# Patient Record
Sex: FEMALE | Race: WHITE | NOT HISPANIC OR LATINO | Employment: OTHER | URBAN - METROPOLITAN AREA
[De-identification: names, ages, dates, MRNs, and addresses within clinical notes are randomized per-mention and may not be internally consistent; named-entity substitution may affect disease eponyms.]

---

## 2019-09-08 ENCOUNTER — HOSPITAL ENCOUNTER (OUTPATIENT)
Dept: RADIOLOGY | Facility: MEDICAL CENTER | Age: 84
End: 2019-09-08

## 2019-09-08 ENCOUNTER — HOSPITAL ENCOUNTER (INPATIENT)
Facility: MEDICAL CENTER | Age: 84
LOS: 5 days | DRG: 291 | End: 2019-09-13
Admitting: FAMILY MEDICINE
Payer: MEDICARE

## 2019-09-08 ENCOUNTER — HOSPITAL ENCOUNTER (OUTPATIENT)
Facility: MEDICAL CENTER | Age: 84
DRG: 291 | End: 2019-09-08
Payer: MEDICARE

## 2019-09-08 DIAGNOSIS — I50.9 ACUTE ON CHRONIC CONGESTIVE HEART FAILURE, UNSPECIFIED HEART FAILURE TYPE (HCC): ICD-10-CM

## 2019-09-08 DIAGNOSIS — I50.33 ACUTE ON CHRONIC DIASTOLIC CONGESTIVE HEART FAILURE (HCC): ICD-10-CM

## 2019-09-08 DIAGNOSIS — J96.21 ACUTE ON CHRONIC RESPIRATORY FAILURE WITH HYPOXIA (HCC): ICD-10-CM
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
## 2019-09-08 PROBLEM — B02.9 HERPES ZOSTER WITHOUT COMPLICATION: Status: ACTIVE | Noted: 2019-09-08

## 2019-09-08 PROBLEM — J96.11 CHRONIC RESPIRATORY FAILURE WITH HYPOXIA (HCC): Status: ACTIVE | Noted: 2019-09-08

## 2019-09-08 PROBLEM — D50.9 MICROCYTIC ANEMIA: Status: ACTIVE | Noted: 2019-09-08

## 2019-09-08 PROBLEM — R79.89 ELEVATED TROPONIN: Status: ACTIVE | Noted: 2019-09-08

## 2019-09-08 PROBLEM — I48.0 PAROXYSMAL ATRIAL FIBRILLATION (HCC): Status: ACTIVE | Noted: 2019-09-08

## 2019-09-08 LAB
FERRITIN SERPL-MCNC: 32.3 NG/ML (ref 10–291)
IRON SATN MFR SERPL: 5 % (ref 15–55)
IRON SERPL-MCNC: 21 UG/DL (ref 40–170)
TIBC SERPL-MCNC: 412 UG/DL (ref 250–450)
TROPONIN T SERPL-MCNC: 23 NG/L (ref 6–19)

## 2019-09-08 PROCEDURE — 83550 IRON BINDING TEST: CPT

## 2019-09-08 PROCEDURE — 84484 ASSAY OF TROPONIN QUANT: CPT

## 2019-09-08 PROCEDURE — 83540 ASSAY OF IRON: CPT

## 2019-09-08 PROCEDURE — 99223 1ST HOSP IP/OBS HIGH 75: CPT | Performed by: HOSPITALIST

## 2019-09-08 PROCEDURE — 36415 COLL VENOUS BLD VENIPUNCTURE: CPT

## 2019-09-08 PROCEDURE — 700102 HCHG RX REV CODE 250 W/ 637 OVERRIDE(OP): Performed by: HOSPITALIST

## 2019-09-08 PROCEDURE — A9270 NON-COVERED ITEM OR SERVICE: HCPCS | Performed by: HOSPITALIST

## 2019-09-08 PROCEDURE — 82728 ASSAY OF FERRITIN: CPT

## 2019-09-08 PROCEDURE — 770027 HCHG ROOM/CARE - NEGATIVE PRESSURE ISOLATION

## 2019-09-08 RX ORDER — LOSARTAN POTASSIUM 50 MG/1
50 TABLET ORAL 2 TIMES DAILY
COMMUNITY
Start: 2019-08-19

## 2019-09-08 RX ORDER — APIXABAN 5 MG/1
TABLET, FILM COATED ORAL
Status: ON HOLD | COMMUNITY
Start: 2019-08-19 | End: 2019-09-08

## 2019-09-08 RX ORDER — BISACODYL 10 MG
10 SUPPOSITORY, RECTAL RECTAL
Status: DISCONTINUED | OUTPATIENT
Start: 2019-09-08 | End: 2019-09-13 | Stop reason: HOSPADM

## 2019-09-08 RX ORDER — ONDANSETRON 4 MG/1
4 TABLET, ORALLY DISINTEGRATING ORAL EVERY 4 HOURS PRN
Status: DISCONTINUED | OUTPATIENT
Start: 2019-09-08 | End: 2019-09-13 | Stop reason: HOSPADM

## 2019-09-08 RX ORDER — AMIODARONE HYDROCHLORIDE 200 MG/1
200 TABLET ORAL EVERY MORNING
COMMUNITY
Start: 2019-08-19

## 2019-09-08 RX ORDER — POTASSIUM CHLORIDE 20 MEQ/1
10 TABLET, EXTENDED RELEASE ORAL DAILY
Status: DISCONTINUED | OUTPATIENT
Start: 2019-09-09 | End: 2019-09-13 | Stop reason: HOSPADM

## 2019-09-08 RX ORDER — LOSARTAN POTASSIUM 50 MG/1
50 TABLET ORAL 2 TIMES DAILY
Status: DISCONTINUED | OUTPATIENT
Start: 2019-09-08 | End: 2019-09-13 | Stop reason: HOSPADM

## 2019-09-08 RX ORDER — LOSARTAN POTASSIUM 50 MG/1
50 TABLET ORAL
Status: DISCONTINUED | OUTPATIENT
Start: 2019-09-09 | End: 2019-09-08

## 2019-09-08 RX ORDER — POLYETHYLENE GLYCOL 3350 17 G/17G
1 POWDER, FOR SOLUTION ORAL
Status: DISCONTINUED | OUTPATIENT
Start: 2019-09-08 | End: 2019-09-13 | Stop reason: HOSPADM

## 2019-09-08 RX ORDER — AMOXICILLIN 250 MG
2 CAPSULE ORAL 2 TIMES DAILY
Status: DISCONTINUED | OUTPATIENT
Start: 2019-09-08 | End: 2019-09-13 | Stop reason: HOSPADM

## 2019-09-08 RX ORDER — ROSUVASTATIN CALCIUM 20 MG/1
10 TABLET, COATED ORAL EVERY EVENING
Status: DISCONTINUED | OUTPATIENT
Start: 2019-09-08 | End: 2019-09-13 | Stop reason: HOSPADM

## 2019-09-08 RX ORDER — AMIODARONE HYDROCHLORIDE 200 MG/1
200 TABLET ORAL
Status: DISCONTINUED | OUTPATIENT
Start: 2019-09-09 | End: 2019-09-13 | Stop reason: HOSPADM

## 2019-09-08 RX ORDER — FUROSEMIDE 40 MG/1
40 TABLET ORAL 2 TIMES DAILY
COMMUNITY
Start: 2019-08-19

## 2019-09-08 RX ORDER — METOPROLOL SUCCINATE 50 MG/1
100 TABLET, EXTENDED RELEASE ORAL
Status: DISCONTINUED | OUTPATIENT
Start: 2019-09-09 | End: 2019-09-13 | Stop reason: HOSPADM

## 2019-09-08 RX ORDER — POTASSIUM CHLORIDE 750 MG/1
10 TABLET, EXTENDED RELEASE ORAL 2 TIMES DAILY
COMMUNITY
Start: 2019-09-05

## 2019-09-08 RX ORDER — ROSUVASTATIN CALCIUM 10 MG/1
10 TABLET, COATED ORAL
COMMUNITY

## 2019-09-08 RX ORDER — ACETAMINOPHEN 325 MG/1
650 TABLET ORAL EVERY 6 HOURS PRN
Status: DISCONTINUED | OUTPATIENT
Start: 2019-09-08 | End: 2019-09-13 | Stop reason: HOSPADM

## 2019-09-08 RX ORDER — ONDANSETRON 2 MG/ML
4 INJECTION INTRAMUSCULAR; INTRAVENOUS EVERY 4 HOURS PRN
Status: DISCONTINUED | OUTPATIENT
Start: 2019-09-08 | End: 2019-09-13 | Stop reason: HOSPADM

## 2019-09-08 RX ORDER — METOPROLOL SUCCINATE 100 MG/1
100 TABLET, EXTENDED RELEASE ORAL EVERY MORNING
COMMUNITY
Start: 2019-08-19

## 2019-09-08 RX ORDER — VALACYCLOVIR HYDROCHLORIDE 500 MG/1
1000 TABLET, FILM COATED ORAL 3 TIMES DAILY
Status: DISCONTINUED | OUTPATIENT
Start: 2019-09-08 | End: 2019-09-13 | Stop reason: HOSPADM

## 2019-09-08 RX ADMIN — APIXABAN 5 MG: 5 TABLET, FILM COATED ORAL at 22:12

## 2019-09-08 RX ADMIN — SENNOSIDES, DOCUSATE SODIUM 2 TABLET: 50; 8.6 TABLET, FILM COATED ORAL at 22:13

## 2019-09-08 RX ADMIN — LOSARTAN POTASSIUM 50 MG: 50 TABLET ORAL at 22:18

## 2019-09-08 RX ADMIN — VALACYCLOVIR HYDROCHLORIDE 1000 MG: 500 TABLET, FILM COATED ORAL at 22:13

## 2019-09-08 ASSESSMENT — PATIENT HEALTH QUESTIONNAIRE - PHQ9
1. LITTLE INTEREST OR PLEASURE IN DOING THINGS: NOT AT ALL
2. FEELING DOWN, DEPRESSED, IRRITABLE, OR HOPELESS: NOT AT ALL
SUM OF ALL RESPONSES TO PHQ9 QUESTIONS 1 AND 2: 0

## 2019-09-08 ASSESSMENT — LIFESTYLE VARIABLES
TOTAL SCORE: 0
TOTAL SCORE: 0
ALCOHOL_USE: NO
DOES PATIENT WANT TO STOP DRINKING: CANNOT ASSESS
EVER HAD A DRINK FIRST THING IN THE MORNING TO STEADY YOUR NERVES TO GET RID OF A HANGOVER: NO
AVERAGE NUMBER OF DAYS PER WEEK YOU HAVE A DRINK CONTAINING ALCOHOL: 0
HAVE PEOPLE ANNOYED YOU BY CRITICIZING YOUR DRINKING: NO
TOTAL SCORE: 0
EVER FELT BAD OR GUILTY ABOUT YOUR DRINKING: NO
EVER_SMOKED: NEVER
HAVE YOU EVER FELT YOU SHOULD CUT DOWN ON YOUR DRINKING: NO
HOW MANY TIMES IN THE PAST YEAR HAVE YOU HAD 5 OR MORE DRINKS IN A DAY: 0
ON A TYPICAL DAY WHEN YOU DRINK ALCOHOL HOW MANY DRINKS DO YOU HAVE: 0
CONSUMPTION TOTAL: NEGATIVE

## 2019-09-08 ASSESSMENT — ENCOUNTER SYMPTOMS
SHORTNESS OF BREATH: 1
COUGH: 1

## 2019-09-08 NOTE — PROGRESS NOTES
Please release ADT order that is signed & held.  Please page the Direct Admit On Call Hospitalist & Cardiologist to notify of patient arrival.  Thank you.

## 2019-09-08 NOTE — PROGRESS NOTES
Transfer from [facility] secondary to CHF exacerbation, slightly elevated troponin of 0.10 and 0.11, cardiology Bailey was called on the case, chronic respiratory failure

## 2019-09-09 PROBLEM — J96.21 ACUTE ON CHRONIC RESPIRATORY FAILURE WITH HYPOXIA (HCC): Status: ACTIVE | Noted: 2019-09-08

## 2019-09-09 PROBLEM — N17.9 AKI (ACUTE KIDNEY INJURY) (HCC): Status: ACTIVE | Noted: 2019-09-09

## 2019-09-09 LAB
ALBUMIN SERPL BCP-MCNC: 3.2 G/DL (ref 3.2–4.9)
ALBUMIN/GLOB SERPL: 1.3 G/DL
ALP SERPL-CCNC: 62 U/L (ref 30–99)
ALT SERPL-CCNC: 26 U/L (ref 2–50)
ANION GAP SERPL CALC-SCNC: 6 MMOL/L (ref 0–11.9)
ANION GAP SERPL CALC-SCNC: 8 MMOL/L (ref 0–11.9)
ANISOCYTOSIS BLD QL SMEAR: ABNORMAL
AST SERPL-CCNC: 31 U/L (ref 12–45)
BASOPHILS # BLD AUTO: 0.6 % (ref 0–1.8)
BASOPHILS # BLD AUTO: 0.6 % (ref 0–1.8)
BASOPHILS # BLD: 0.03 K/UL (ref 0–0.12)
BASOPHILS # BLD: 0.03 K/UL (ref 0–0.12)
BILIRUB SERPL-MCNC: 0.8 MG/DL (ref 0.1–1.5)
BUN SERPL-MCNC: 19 MG/DL (ref 8–22)
BUN SERPL-MCNC: 21 MG/DL (ref 8–22)
CALCIUM SERPL-MCNC: 8.4 MG/DL (ref 8.5–10.5)
CALCIUM SERPL-MCNC: 8.5 MG/DL (ref 8.5–10.5)
CHLORIDE SERPL-SCNC: 107 MMOL/L (ref 96–112)
CHLORIDE SERPL-SCNC: 109 MMOL/L (ref 96–112)
CO2 SERPL-SCNC: 29 MMOL/L (ref 20–33)
CO2 SERPL-SCNC: 31 MMOL/L (ref 20–33)
COMMENT 1642: NORMAL
CREAT SERPL-MCNC: 0.96 MG/DL (ref 0.5–1.4)
CREAT SERPL-MCNC: 0.99 MG/DL (ref 0.5–1.4)
EOSINOPHIL # BLD AUTO: 0.09 K/UL (ref 0–0.51)
EOSINOPHIL # BLD AUTO: 0.14 K/UL (ref 0–0.51)
EOSINOPHIL NFR BLD: 1.8 % (ref 0–6.9)
EOSINOPHIL NFR BLD: 2.9 % (ref 0–6.9)
ERYTHROCYTE [DISTWIDTH] IN BLOOD BY AUTOMATED COUNT: 56.1 FL (ref 35.9–50)
ERYTHROCYTE [DISTWIDTH] IN BLOOD BY AUTOMATED COUNT: 56.7 FL (ref 35.9–50)
GLOBULIN SER CALC-MCNC: 2.4 G/DL (ref 1.9–3.5)
GLUCOSE SERPL-MCNC: 116 MG/DL (ref 65–99)
GLUCOSE SERPL-MCNC: 117 MG/DL (ref 65–99)
HCT VFR BLD AUTO: 31.1 % (ref 37–47)
HCT VFR BLD AUTO: 31.8 % (ref 37–47)
HGB BLD-MCNC: 8.6 G/DL (ref 12–16)
HGB BLD-MCNC: 8.7 G/DL (ref 12–16)
HYPOCHROMIA BLD QL SMEAR: ABNORMAL
IMM GRANULOCYTES # BLD AUTO: 0.02 K/UL (ref 0–0.11)
IMM GRANULOCYTES # BLD AUTO: 0.03 K/UL (ref 0–0.11)
IMM GRANULOCYTES NFR BLD AUTO: 0.4 % (ref 0–0.9)
IMM GRANULOCYTES NFR BLD AUTO: 0.6 % (ref 0–0.9)
LYMPHOCYTES # BLD AUTO: 0.62 K/UL (ref 1–4.8)
LYMPHOCYTES # BLD AUTO: 0.81 K/UL (ref 1–4.8)
LYMPHOCYTES NFR BLD: 12.3 % (ref 22–41)
LYMPHOCYTES NFR BLD: 16.9 % (ref 22–41)
MCH RBC QN AUTO: 22.8 PG (ref 27–33)
MCH RBC QN AUTO: 22.9 PG (ref 27–33)
MCHC RBC AUTO-ENTMCNC: 27.4 G/DL (ref 33.6–35)
MCHC RBC AUTO-ENTMCNC: 27.7 G/DL (ref 33.6–35)
MCV RBC AUTO: 82.9 FL (ref 81.4–97.8)
MCV RBC AUTO: 83.2 FL (ref 81.4–97.8)
MICROCYTES BLD QL SMEAR: ABNORMAL
MONOCYTES # BLD AUTO: 0.37 K/UL (ref 0–0.85)
MONOCYTES # BLD AUTO: 0.38 K/UL (ref 0–0.85)
MONOCYTES NFR BLD AUTO: 7.5 % (ref 0–13.4)
MONOCYTES NFR BLD AUTO: 7.7 % (ref 0–13.4)
MORPHOLOGY BLD-IMP: NORMAL
NEUTROPHILS # BLD AUTO: 3.41 K/UL (ref 2–7.15)
NEUTROPHILS # BLD AUTO: 3.89 K/UL (ref 2–7.15)
NEUTROPHILS NFR BLD: 71.5 % (ref 44–72)
NEUTROPHILS NFR BLD: 77.2 % (ref 44–72)
NRBC # BLD AUTO: 0 K/UL
NRBC # BLD AUTO: 0 K/UL
NRBC BLD-RTO: 0 /100 WBC
NRBC BLD-RTO: 0 /100 WBC
OVALOCYTES BLD QL SMEAR: NORMAL
PLATELET # BLD AUTO: 195 K/UL (ref 164–446)
PLATELET # BLD AUTO: 196 K/UL (ref 164–446)
PLATELET BLD QL SMEAR: NORMAL
PMV BLD AUTO: 10 FL (ref 9–12.9)
PMV BLD AUTO: 10.4 FL (ref 9–12.9)
POIKILOCYTOSIS BLD QL SMEAR: NORMAL
POTASSIUM SERPL-SCNC: 4.3 MMOL/L (ref 3.6–5.5)
POTASSIUM SERPL-SCNC: 4.5 MMOL/L (ref 3.6–5.5)
PROT SERPL-MCNC: 5.6 G/DL (ref 6–8.2)
RBC # BLD AUTO: 3.75 M/UL (ref 4.2–5.4)
RBC # BLD AUTO: 3.82 M/UL (ref 4.2–5.4)
RBC BLD AUTO: PRESENT
SODIUM SERPL-SCNC: 144 MMOL/L (ref 135–145)
SODIUM SERPL-SCNC: 146 MMOL/L (ref 135–145)
TROPONIN T SERPL-MCNC: 21 NG/L (ref 6–19)
TROPONIN T SERPL-MCNC: 24 NG/L (ref 6–19)
WBC # BLD AUTO: 4.8 K/UL (ref 4.8–10.8)
WBC # BLD AUTO: 5 K/UL (ref 4.8–10.8)

## 2019-09-09 PROCEDURE — 90662 IIV NO PRSV INCREASED AG IM: CPT | Performed by: HOSPITALIST

## 2019-09-09 PROCEDURE — 80048 BASIC METABOLIC PNL TOTAL CA: CPT

## 2019-09-09 PROCEDURE — 93010 ELECTROCARDIOGRAM REPORT: CPT | Performed by: INTERNAL MEDICINE

## 2019-09-09 PROCEDURE — 3E0234Z INTRODUCTION OF SERUM, TOXOID AND VACCINE INTO MUSCLE, PERCUTANEOUS APPROACH: ICD-10-PCS | Performed by: HOSPITALIST

## 2019-09-09 PROCEDURE — 97166 OT EVAL MOD COMPLEX 45 MIN: CPT

## 2019-09-09 PROCEDURE — 90471 IMMUNIZATION ADMIN: CPT

## 2019-09-09 PROCEDURE — 99233 SBSQ HOSP IP/OBS HIGH 50: CPT | Performed by: HOSPITALIST

## 2019-09-09 PROCEDURE — 93005 ELECTROCARDIOGRAM TRACING: CPT | Performed by: HOSPITALIST

## 2019-09-09 PROCEDURE — 700102 HCHG RX REV CODE 250 W/ 637 OVERRIDE(OP): Performed by: HOSPITALIST

## 2019-09-09 PROCEDURE — 94760 N-INVAS EAR/PLS OXIMETRY 1: CPT

## 2019-09-09 PROCEDURE — 85025 COMPLETE CBC W/AUTO DIFF WBC: CPT

## 2019-09-09 PROCEDURE — 700111 HCHG RX REV CODE 636 W/ 250 OVERRIDE (IP): Performed by: HOSPITALIST

## 2019-09-09 PROCEDURE — 36415 COLL VENOUS BLD VENIPUNCTURE: CPT

## 2019-09-09 PROCEDURE — 770020 HCHG ROOM/CARE - TELE (206)

## 2019-09-09 PROCEDURE — 84484 ASSAY OF TROPONIN QUANT: CPT

## 2019-09-09 PROCEDURE — 97162 PT EVAL MOD COMPLEX 30 MIN: CPT

## 2019-09-09 PROCEDURE — 80053 COMPREHEN METABOLIC PANEL: CPT

## 2019-09-09 PROCEDURE — A9270 NON-COVERED ITEM OR SERVICE: HCPCS | Performed by: HOSPITALIST

## 2019-09-09 RX ADMIN — SENNOSIDES, DOCUSATE SODIUM 2 TABLET: 50; 8.6 TABLET, FILM COATED ORAL at 06:32

## 2019-09-09 RX ADMIN — ACETAMINOPHEN 650 MG: 325 TABLET, FILM COATED ORAL at 19:21

## 2019-09-09 RX ADMIN — AMIODARONE HYDROCHLORIDE 200 MG: 200 TABLET ORAL at 06:28

## 2019-09-09 RX ADMIN — APIXABAN 5 MG: 5 TABLET, FILM COATED ORAL at 06:28

## 2019-09-09 RX ADMIN — ROSUVASTATIN CALCIUM 10 MG: 20 TABLET, FILM COATED ORAL at 17:59

## 2019-09-09 RX ADMIN — POTASSIUM CHLORIDE 10 MEQ: 20 TABLET, EXTENDED RELEASE ORAL at 06:26

## 2019-09-09 RX ADMIN — VALACYCLOVIR HYDROCHLORIDE 1000 MG: 500 TABLET, FILM COATED ORAL at 12:25

## 2019-09-09 RX ADMIN — LOSARTAN POTASSIUM 50 MG: 50 TABLET ORAL at 06:27

## 2019-09-09 RX ADMIN — VALACYCLOVIR HYDROCHLORIDE 1000 MG: 500 TABLET, FILM COATED ORAL at 06:26

## 2019-09-09 RX ADMIN — METOPROLOL SUCCINATE 100 MG: 50 TABLET, FILM COATED, EXTENDED RELEASE ORAL at 06:27

## 2019-09-09 RX ADMIN — INFLUENZA A VIRUS A/MICHIGAN/45/2015 X-275 (H1N1) ANTIGEN (FORMALDEHYDE INACTIVATED), INFLUENZA A VIRUS A/SINGAPORE/INFIMH-16-0019/2016 IVR-186 (H3N2) ANTIGEN (FORMALDEHYDE INACTIVATED), AND INFLUENZA B VIRUS B/MARYLAND/15/2016 BX-69A (A B/COLORADO/6/2017-LIKE VIRUS) ANTIGEN (FORMALDEHYDE INACTIVATED) 0.5 ML: 60; 60; 60 INJECTION, SUSPENSION INTRAMUSCULAR at 12:23

## 2019-09-09 RX ADMIN — VALACYCLOVIR HYDROCHLORIDE 1000 MG: 500 TABLET, FILM COATED ORAL at 17:59

## 2019-09-09 RX ADMIN — SENNOSIDES, DOCUSATE SODIUM 2 TABLET: 50; 8.6 TABLET, FILM COATED ORAL at 18:00

## 2019-09-09 RX ADMIN — APIXABAN 5 MG: 5 TABLET, FILM COATED ORAL at 18:00

## 2019-09-09 RX ADMIN — LOSARTAN POTASSIUM 50 MG: 50 TABLET ORAL at 17:59

## 2019-09-09 ASSESSMENT — COGNITIVE AND FUNCTIONAL STATUS - GENERAL
DRESSING REGULAR UPPER BODY CLOTHING: A LITTLE
WALKING IN HOSPITAL ROOM: A LITTLE
STANDING UP FROM CHAIR USING ARMS: A LOT
MOBILITY SCORE: 15
CLIMB 3 TO 5 STEPS WITH RAILING: A LOT
MOBILITY SCORE: 14
CLIMB 3 TO 5 STEPS WITH RAILING: A LITTLE
MOVING FROM LYING ON BACK TO SITTING ON SIDE OF FLAT BED: UNABLE
PERSONAL GROOMING: A LITTLE
SUGGESTED CMS G CODE MODIFIER DAILY ACTIVITY: CK
DAILY ACTIVITIY SCORE: 17
MOVING TO AND FROM BED TO CHAIR: A LITTLE
DRESSING REGULAR LOWER BODY CLOTHING: A LITTLE
TOILETING: A LITTLE
TURNING FROM BACK TO SIDE WHILE IN FLAT BAD: A LITTLE
TURNING FROM BACK TO SIDE WHILE IN FLAT BAD: A LITTLE
SUGGESTED CMS G CODE MODIFIER DAILY ACTIVITY: CK
TOILETING: A LOT
STANDING UP FROM CHAIR USING ARMS: A LITTLE
MOVING TO AND FROM BED TO CHAIR: UNABLE
DRESSING REGULAR UPPER BODY CLOTHING: A LOT
SUGGESTED CMS G CODE MODIFIER MOBILITY: CL
HELP NEEDED FOR BATHING: A LITTLE
MOVING FROM LYING ON BACK TO SITTING ON SIDE OF FLAT BED: A LOT
SUGGESTED CMS G CODE MODIFIER MOBILITY: CK
DAILY ACTIVITIY SCORE: 15
EATING MEALS: A LITTLE
HELP NEEDED FOR BATHING: A LOT
WALKING IN HOSPITAL ROOM: A LITTLE
EATING MEALS: A LITTLE
PERSONAL GROOMING: A LITTLE
DRESSING REGULAR LOWER BODY CLOTHING: A LOT

## 2019-09-09 ASSESSMENT — ENCOUNTER SYMPTOMS
FALLS: 1
PHOTOPHOBIA: 0
POLYDIPSIA: 0
HEARTBURN: 0
CLAUDICATION: 0
FLANK PAIN: 0
CHILLS: 0
WEAKNESS: 1
BRUISES/BLEEDS EASILY: 0
BLURRED VISION: 0
SORE THROAT: 0
NECK PAIN: 0
CONSTIPATION: 0
PALPITATIONS: 0
NAUSEA: 0
DOUBLE VISION: 0
TINGLING: 0
MYALGIAS: 0
ABDOMINAL PAIN: 0
SINUS PAIN: 0
COUGH: 1
WHEEZING: 0
HEMOPTYSIS: 0
DIARRHEA: 0
EYE PAIN: 0
STRIDOR: 0
HALLUCINATIONS: 0
FEVER: 0
ORTHOPNEA: 0
DIZZINESS: 0
PND: 0
VOMITING: 0
BACK PAIN: 0
DIAPHORESIS: 0
SPUTUM PRODUCTION: 0
DEPRESSION: 0
HEADACHES: 0
TREMORS: 0
BLOOD IN STOOL: 0
SHORTNESS OF BREATH: 1

## 2019-09-09 ASSESSMENT — GAIT ASSESSMENTS
GAIT LEVEL OF ASSIST: MINIMAL ASSIST
DISTANCE (FEET): 20
ASSISTIVE DEVICE: FRONT WHEEL WALKER
DEVIATION: BRADYKINETIC;STEP TO;ANTALGIC

## 2019-09-09 ASSESSMENT — COPD QUESTIONNAIRES
DURING THE PAST 4 WEEKS HOW MUCH DID YOU FEEL SHORT OF BREATH: SOME OF THE TIME
DO YOU EVER COUGH UP ANY MUCUS OR PHLEGM?: NO/ONLY WITH OCCASIONAL COLDS OR INFECTIONS
HAVE YOU SMOKED AT LEAST 100 CIGARETTES IN YOUR ENTIRE LIFE: NO/DON'T KNOW
COPD SCREENING SCORE: 3

## 2019-09-09 ASSESSMENT — LIFESTYLE VARIABLES
SUBSTANCE_ABUSE: 0
EVER_SMOKED: NEVER

## 2019-09-09 ASSESSMENT — ACTIVITIES OF DAILY LIVING (ADL): TOILETING: INDEPENDENT

## 2019-09-09 NOTE — THERAPY
"Occupational Therapy Evaluation completed.   Functional Status:  Min A supine to sit.  Max A LB dressing.  Pt walked to bathroom w/FWW supervised.  Pt required min A toilet transfer, mod A toilet hygiene.  Pt stood at sink to wash hands with SBA.  Pt left up in chair at end of session.  Plan of Care: Will benefit from Occupational Therapy 2 times per week  Discharge Recommendations:  Equipment: Will Continue to Assess for Equipment Needs. .Recommend post-acute placement for additional occupational therapy services prior to discharge home.     Pt is 87 y/o F seen for OT evaluation.  Pt admitted with CHF exacerbation.  Pt with hx of afib and O2 dependence.  Pt presents with impaired activity tolerance, balance, and safety awareness.  Pt attempted to sit on toilet and instead almost completely missed the toilet with max A for awareness of this.  Pt will continue to benefit from acute OT services and may also benefit from post acute placement prior to DC home.     See \"Rehab Therapy-Acute\" Patient Summary Report for complete documentation.    "

## 2019-09-09 NOTE — ASSESSMENT & PLAN NOTE
Stool for Hemoccult at the outlying facility was negative. Severe iron deficiency   - IV Venofer  - Monitor hemoglobin and transfuse if less than 7

## 2019-09-09 NOTE — PROGRESS NOTES
Received Bedside report. Assumed care at 0700. This pt is AOx4, ambulatory with SBA w/ walker, voiding adequately, reports no pain. Patient and RN discussed plan of care: questions answered. Labs noted, assessment complete, patient tolerating cardaic diet. Tele box in place. Pt is on 5L of O2 via NC. Call light in place, fall precautions in place, patient educated on importance of calling for assistance. No additional needs at this time. VSS

## 2019-09-09 NOTE — ASSESSMENT & PLAN NOTE
Normal EF of 60% with grade 3 diastolic dysfunction.  - s/p IV lasix  - resumed home regimen of PO lasix today  - cardiology evaluated, appreciate recs  - correct electrolytes  - medical management   - outpatient follow up with Cardiology at [place]  - I/Os, daily weights

## 2019-09-09 NOTE — PROGRESS NOTES
Hospitalist paged to update on lab results,  request follow up lab orders and notify of increased oxygen demand. Awaiting return call. Will continue to monitor  0310 Spoke with Dr Palmer, new orders received

## 2019-09-09 NOTE — CARE PLAN
Problem: Safety  Goal: Will remain free from falls  Outcome: PROGRESSING AS EXPECTED  Note:   Pt remains free from falls at this time. Safety precautions in place. Pt educated on calling for assistance when needed.        Problem: Knowledge Deficit  Goal: Knowledge of disease process/condition, treatment plan, diagnostic tests, and medications will improve  Outcome: PROGRESSING AS EXPECTED  Note:   Pt updated on POC, tests, and medications. Pt verbalizes understanding and has no further questions at this time. Pt educated on calling for any more questions.

## 2019-09-09 NOTE — PROGRESS NOTES
Med rec complete per pt's rx bottles- reviewed with pt. Daughter is taking medications back home. Allergies verified with pt.

## 2019-09-09 NOTE — THERAPY
"Physical Therapy Evaluation completed.   Bed Mobility:  Supine to Sit: Minimal Assist  Transfers: Sit to Stand: Minimal Assist  Gait: Level Of Assist: Minimal Assist with Front-Wheel Walker       Plan of Care: Will benefit from Physical Therapy 3 times per week  Discharge Recommendations: Equipment: Will Continue to Assess for Equipment Needs. Post-acute therapy Recommend home health transitional care for continued physical therapy services.        See \"Rehab Therapy-Acute\" Patient Summary Report for complete documentation.     "

## 2019-09-09 NOTE — PROGRESS NOTES
Bedside report received from day shift RN. Pt alert sitting up in bed with daughter present at bedside. No complaints of pain at this time. No signs or symptoms of resp distress noted or reported on 3 L/min via NC. Bed in low and locked position, call button within reach and fall precautions are in place. Pt is being transferred to isolation room for contact and airborne precautions secondary to shingles of the LLE

## 2019-09-09 NOTE — PROGRESS NOTES
Assumed care of patient, report received from  RN over the phone and [facility] upon arrival. Tele box on and monitor room notified. Transferred to bed from [facility]. VSS, assessment complete. Oriented to room and call light, educated on the importance of calling before getting out of bed. Verbalized understanding and no additional questions. Bed locked and in lowest position, treaded socks on. Bed alarm plugged in and on. Reviewed orders and labs.      Was notified by [facility] on arrival that patient has active shingles. Discussed with Charge RN and pt to move to 710 when clean, rash on LLE was covered with dressing.     Admitting called and saw patient at bedside, paged admitting hosp.

## 2019-09-09 NOTE — PROGRESS NOTES
Pt sleeping in bed, wakes easily to voice. Pt ambulated to the bathroom. Pt has a slow steady gait utilizing front wheeled walker and standby assistance. No complaints of pain at this time. Mild dyspnea noted with exertion that is relieved with rest and supplemental oxygen at 3 L/min via NC. RLE was rewrapped with dry clean gauze. Bed in low and locked position, call button within reach and fall precautions are in place. Pt remains of contact and airborne precautions in isolation room.

## 2019-09-09 NOTE — PROGRESS NOTES
2 RN skin check complete.   Devices in place tele box and nasal cannula.  Skin assessed under devices yes.  Confirmed pressure ulcers found on NA.  New potential pressure ulcers noted on NA. Wound consult placed YES.  The following interventions in place pillows in place for positioning, draw sheet in place for repositioning. Pt is able to turn herself from side to side, silicon nasal cannula    Blanching redness to coccyx  Scattered bruising to left scapula  LLE is red, warm and has several fluid filled blisters which is active shingles  LLE was wrapped with dry gauze roll and pt placed on airborne and contact precautions in isolation room

## 2019-09-09 NOTE — CARE PLAN
Problem: Safety  Goal: Will remain free from injury  Outcome: PROGRESSING AS EXPECTED  Intervention: Provide assistance with mobility  Flowsheets (Taken 9/9/2019 1610)  Assistance: Assistance of One  Ambulation Tolerance: Tolerates Well; General Weakness  Note:   Make sure pt has walker when ambulating, sba to guide pt while ambulating        Problem: Pain Management  Goal: Pain level will decrease to patient's comfort goal  Outcome: PROGRESSING AS EXPECTED  Intervention: Educate and implement non-pharmacologic comfort measures. Examples: relaxation, distration, play therapy, activity therapy, massage, etc.  Flowsheets (Taken 9/9/2019 1621)  Intervention: Distraction; [place]; Repositioned; Cold Pack  Note:   Assess pain at least q 4, reassess after med administration

## 2019-09-09 NOTE — ASSESSMENT & PLAN NOTE
Resolved. Likely related to her CHF. Type II demand.   - medical management  - evaluated by cardiology today, appreciate recs

## 2019-09-09 NOTE — H&P
Hospital Medicine History & Physical Note    Date of Service  9/8/2019    Primary Care Physician  Melina Figueroa P.A.-C.    Consultants  Cardiology    Code Status  Discussed with patient in presence of her daughter her wishes her to be DNR/DNI    Chief Complaint  Recurrent falls    History of Presenting Illness  86 y.o. female who presented 9/8/2019 with history of atrial fibrillation for which she follows up with [place]'s cardiology has been maintained on amiodarone metoprolol and anticoagulation with Eliquis as well as chronic lung disease followed up by [place] pulmonary had abnormal PFTs but is unsure of the diagnosis and has been maintained on oxygen at 3 L the patient moved about 1 year ago to live with her daughter after her  passed away, over the past few days she has had recurrent falls and generalized malaise she has also noted a rash on her right lower extremity that started on Tuesday progressively got worse associated with a burning pain and increased bilateral lower extremity edema.  She was seen by her PCP and her Lasix was increased over the past 4 days. she was taken to local emergency room today after falling this morning and after they noted her edema they initiated cardiac work-up and her troponin was mildly elevated so she was transferred to our facility for cardiology evaluation.  The transferring physician discussed her case with Dr. Avalos    Review of Systems  Review of Systems   Respiratory: Positive for cough and shortness of breath.    Cardiovascular: Positive for leg swelling. Negative for chest pain.   Skin: Positive for rash.   All other systems reviewed and are negative.      Past Medical History  CHF hypertension A. fib chronic lung disease  Surgical History  Back surgery, hysterectomy, resection of benign ovarian tumor    Family History  Reviewed and not pertinent to the presenting problem    Social History    She is a lifetime non-smoker no alcohol or illicit drug use she is  currently living with her daughter    Allergies  Allergies   Allergen Reactions   • Penicillins Hives       Medications  Prior to Admission Medications   Prescriptions Last Dose Informant Patient Reported? Taking?   ELIQUIS 5 MG Tab 9/7/2019 at Unknown time  Yes No   amiodarone (CORDARONE) 200 MG Tab 9/7/2019 at Unknown time  Yes No   furosemide (LASIX) 40 MG Tab 9/7/2019 at Unknown time  Yes No   losartan (COZAAR) 50 MG Tab 9/8/2019 at 1300  Yes No   metoprolol SR (TOPROL XL) 100 MG TABLET SR 24 HR 9/7/2019 at Unknown time  Yes No   potassium chloride SA (K-DUR) 10 MEQ Tab CR   Yes No   rosuvastatin (CRESTOR) 10 MG Tab 9/7/2019 at Unknown time  Yes Yes   Sig: Take 10 mg by mouth every evening.      Facility-Administered Medications: None       Physical Exam  Temp:  [36.8 °C (98.2 °F)] 36.8 °C (98.2 °F)  Pulse:  [70] 70  Resp:  [16] 16  BP: (161)/(62) 161/62  SpO2:  [93 %] 93 %    Physical Exam   Constitutional: She is oriented to person, place, and time. She appears well-developed and well-nourished.   HENT:   Head: Normocephalic and atraumatic.   Right Ear: External ear normal.   Left Ear: External ear normal.   Mouth/Throat: No oropharyngeal exudate.   Eyes: Conjunctivae are normal. Right eye exhibits no discharge. Left eye exhibits no discharge. No scleral icterus.   Neck: Neck supple. No JVD present. No tracheal deviation present.   Cardiovascular: Normal rate and regular rhythm. Exam reveals no gallop and no friction rub.   Murmur heard.  Pulmonary/Chest: Effort normal. No stridor. No respiratory distress. She has no wheezes. She has rales. She exhibits no tenderness.   Abdominal: Soft. Bowel sounds are normal. She exhibits no distension and no mass. There is no tenderness. There is no rebound and no guarding.   Musculoskeletal: She exhibits edema. She exhibits no tenderness.   Neurological: She is alert and oriented to person, place, and time. No cranial nerve deficit. She exhibits normal muscle tone.   Skin:  Skin is warm and dry. Rash (Vesicular rash right lower extremity) noted. She is not diaphoretic. No cyanosis. Nails show no clubbing.   Psychiatric: She has a normal mood and affect. Her behavior is normal. Thought content normal.   Nursing note and vitals reviewed.      Laboratory:          No results for input(s): ALTSGPT, ASTSGOT, ALKPHOSPHAT, TBILIRUBIN, DBILIRUBIN, GAMMAGT, AMYLASE, LIPASE, ALB, PREALBUMIN, GLUCOSE in the last 72 hours.      No results for input(s): NTPROBNP in the last 72 hours.      No results for input(s): TROPONINT in the last 72 hours.    Urinalysis:    No results found     Imaging:  EC-ECHOCARDIOGRAM COMPLETE W/O CONT    (Results Pending)     Chest x-ray done at the [facility] revealed mild bibasilar atelectasis    X-ray of the right knee reveals soft tissue swelling with severe medial compartment arthrosis    CBC revealed:  WBC 4.4 hemoglobin 8.8 hematocrit 30.5 MCV 79 platelet count 194 sed rate 13 glucose 109 BN 28 creatinine 1.06 sodium 145 potassium 3.6 chloride 108 CO2 30 total bilirubin 0.8 AST 44 ALT 34, phosphatase 81 troponin I was 0.1 recheck troponin I was 0.11  TSH 1.3 urinalysis revealed negative nitrites negative leukocyte esterase stool occult blood was negative    Assessment/Plan:  I anticipate this patient will require at least two midnights for appropriate medical management, necessitating inpatient admission.    * Acute on chronic congestive heart failure (HCC)  Assessment & Plan  Unknown ejection fraction    We will check echocardiogram  I will switch her Lasix to IV and will monitor her intake and output electrolytes and renal function    Microcytic anemia  Assessment & Plan  Stool for Hemoccult at the [facility] was negative  We will check iron panel  Recheck CBC in a.m.    Elevated troponin  Assessment & Plan  Likely related to her CHF  Doubt acute coronary syndrome  Patient denies having any chest pain  We will trend her troponin  Continue  with current medical therapy and follow-up on echocardiogram  Dr. Avalos has been consulted by the transferring physician and will await his evaluation and further recommendations    Paroxysmal atrial fibrillation (HCC)  Assessment & Plan  Continue amiodarone and metoprolol  Continue Eliquis  We will try to obtain records from [place]'s cardiology and pulmonary  Patient's daughter reports that she had abnormal PFTs suggesting that she has 60% lung function may need to reevaluate amiodarone therapy after review of those records  We will defer to cardiology    Falls  Assessment & Plan  PT/OT eval's    Herpes zoster without complication  Assessment & Plan  I will start her on Valtrex 1 g 3 times daily for 7 days  Skin care    Chronic respiratory failure with hypoxia (HCC)  Assessment & Plan  Continue oxygen she is at baseline on 3 to 4 L nasal cannula  Monitor with diuresis    Plan of care reviewed with patient and daughter at bedside and her questions answered      VTE prophylaxis: Eliquis

## 2019-09-10 ENCOUNTER — APPOINTMENT (OUTPATIENT)
Dept: CARDIOLOGY | Facility: MEDICAL CENTER | Age: 84
DRG: 291 | End: 2019-09-10
Attending: HOSPITALIST
Payer: MEDICARE

## 2019-09-10 LAB
ALBUMIN SERPL BCP-MCNC: 3.2 G/DL (ref 3.2–4.9)
ALBUMIN/GLOB SERPL: 1.3 G/DL
ALP SERPL-CCNC: 70 U/L (ref 30–99)
ALT SERPL-CCNC: 24 U/L (ref 2–50)
ANION GAP SERPL CALC-SCNC: 5 MMOL/L (ref 0–11.9)
AST SERPL-CCNC: 24 U/L (ref 12–45)
BASOPHILS # BLD AUTO: 0.4 % (ref 0–1.8)
BASOPHILS # BLD: 0.02 K/UL (ref 0–0.12)
BILIRUB SERPL-MCNC: 0.6 MG/DL (ref 0.1–1.5)
BUN SERPL-MCNC: 27 MG/DL (ref 8–22)
CALCIUM SERPL-MCNC: 8.3 MG/DL (ref 8.5–10.5)
CHLORIDE SERPL-SCNC: 107 MMOL/L (ref 96–112)
CHLORIDE UR-SCNC: 18 MMOL/L
CO2 SERPL-SCNC: 30 MMOL/L (ref 20–33)
CREAT SERPL-MCNC: 1 MG/DL (ref 0.5–1.4)
CREAT UR-MCNC: 130.3 MG/DL
EKG IMPRESSION: NORMAL
EOSINOPHIL # BLD AUTO: 0.2 K/UL (ref 0–0.51)
EOSINOPHIL NFR BLD: 3.8 % (ref 0–6.9)
ERYTHROCYTE [DISTWIDTH] IN BLOOD BY AUTOMATED COUNT: 56 FL (ref 35.9–50)
GLOBULIN SER CALC-MCNC: 2.5 G/DL (ref 1.9–3.5)
GLUCOSE SERPL-MCNC: 117 MG/DL (ref 65–99)
HCT VFR BLD AUTO: 30.9 % (ref 37–47)
HGB BLD-MCNC: 8.4 G/DL (ref 12–16)
IMM GRANULOCYTES # BLD AUTO: 0.03 K/UL (ref 0–0.11)
IMM GRANULOCYTES NFR BLD AUTO: 0.6 % (ref 0–0.9)
LV EJECT FRACT  99904: 60
LV EJECT FRACT MOD 2C 99903: 60.07
LV EJECT FRACT MOD 4C 99902: 71.77
LV EJECT FRACT MOD BP 99901: 66.63
LYMPHOCYTES # BLD AUTO: 0.81 K/UL (ref 1–4.8)
LYMPHOCYTES NFR BLD: 15.3 % (ref 22–41)
MAGNESIUM SERPL-MCNC: 2.3 MG/DL (ref 1.5–2.5)
MCH RBC QN AUTO: 22.3 PG (ref 27–33)
MCHC RBC AUTO-ENTMCNC: 27.2 G/DL (ref 33.6–35)
MCV RBC AUTO: 82.2 FL (ref 81.4–97.8)
MONOCYTES # BLD AUTO: 0.37 K/UL (ref 0–0.85)
MONOCYTES NFR BLD AUTO: 7 % (ref 0–13.4)
NEUTROPHILS # BLD AUTO: 3.86 K/UL (ref 2–7.15)
NEUTROPHILS NFR BLD: 72.9 % (ref 44–72)
NRBC # BLD AUTO: 0 K/UL
NRBC BLD-RTO: 0 /100 WBC
PHOSPHATE SERPL-MCNC: 3.9 MG/DL (ref 2.5–4.5)
PLATELET # BLD AUTO: 176 K/UL (ref 164–446)
PMV BLD AUTO: 9.7 FL (ref 9–12.9)
POTASSIUM SERPL-SCNC: 4 MMOL/L (ref 3.6–5.5)
POTASSIUM UR-SCNC: 76.6 MMOL/L
PROCALCITONIN SERPL-MCNC: <0.05 NG/ML
PROT SERPL-MCNC: 5.7 G/DL (ref 6–8.2)
PROT UR-MCNC: 50.2 MG/DL (ref 0–15)
RBC # BLD AUTO: 3.76 M/UL (ref 4.2–5.4)
SODIUM SERPL-SCNC: 142 MMOL/L (ref 135–145)
SODIUM UR-SCNC: 27 MMOL/L
WBC # BLD AUTO: 5.3 K/UL (ref 4.8–10.8)

## 2019-09-10 PROCEDURE — 93306 TTE W/DOPPLER COMPLETE: CPT

## 2019-09-10 PROCEDURE — 84156 ASSAY OF PROTEIN URINE: CPT

## 2019-09-10 PROCEDURE — 93306 TTE W/DOPPLER COMPLETE: CPT | Mod: 26 | Performed by: INTERNAL MEDICINE

## 2019-09-10 PROCEDURE — 84300 ASSAY OF URINE SODIUM: CPT

## 2019-09-10 PROCEDURE — 84133 ASSAY OF URINE POTASSIUM: CPT

## 2019-09-10 PROCEDURE — 80053 COMPREHEN METABOLIC PANEL: CPT

## 2019-09-10 PROCEDURE — 84145 PROCALCITONIN (PCT): CPT

## 2019-09-10 PROCEDURE — 83735 ASSAY OF MAGNESIUM: CPT

## 2019-09-10 PROCEDURE — 82436 ASSAY OF URINE CHLORIDE: CPT

## 2019-09-10 PROCEDURE — 82570 ASSAY OF URINE CREATININE: CPT

## 2019-09-10 PROCEDURE — 99222 1ST HOSP IP/OBS MODERATE 55: CPT | Performed by: INTERNAL MEDICINE

## 2019-09-10 PROCEDURE — 700102 HCHG RX REV CODE 250 W/ 637 OVERRIDE(OP): Performed by: HOSPITALIST

## 2019-09-10 PROCEDURE — 36415 COLL VENOUS BLD VENIPUNCTURE: CPT

## 2019-09-10 PROCEDURE — 99232 SBSQ HOSP IP/OBS MODERATE 35: CPT | Performed by: HOSPITALIST

## 2019-09-10 PROCEDURE — 700111 HCHG RX REV CODE 636 W/ 250 OVERRIDE (IP): Performed by: HOSPITALIST

## 2019-09-10 PROCEDURE — 770020 HCHG ROOM/CARE - TELE (206)

## 2019-09-10 PROCEDURE — A9270 NON-COVERED ITEM OR SERVICE: HCPCS | Performed by: HOSPITALIST

## 2019-09-10 PROCEDURE — 85025 COMPLETE CBC W/AUTO DIFF WBC: CPT

## 2019-09-10 PROCEDURE — 84100 ASSAY OF PHOSPHORUS: CPT

## 2019-09-10 RX ADMIN — AMIODARONE HYDROCHLORIDE 200 MG: 200 TABLET ORAL at 05:57

## 2019-09-10 RX ADMIN — APIXABAN 5 MG: 5 TABLET, FILM COATED ORAL at 05:57

## 2019-09-10 RX ADMIN — POTASSIUM CHLORIDE 10 MEQ: 20 TABLET, EXTENDED RELEASE ORAL at 05:56

## 2019-09-10 RX ADMIN — VALACYCLOVIR HYDROCHLORIDE 1000 MG: 500 TABLET, FILM COATED ORAL at 12:52

## 2019-09-10 RX ADMIN — VALACYCLOVIR HYDROCHLORIDE 1000 MG: 500 TABLET, FILM COATED ORAL at 18:05

## 2019-09-10 RX ADMIN — ROSUVASTATIN CALCIUM 10 MG: 20 TABLET, FILM COATED ORAL at 18:06

## 2019-09-10 RX ADMIN — SENNOSIDES, DOCUSATE SODIUM 2 TABLET: 50; 8.6 TABLET, FILM COATED ORAL at 18:05

## 2019-09-10 RX ADMIN — VALACYCLOVIR HYDROCHLORIDE 1000 MG: 500 TABLET, FILM COATED ORAL at 05:56

## 2019-09-10 RX ADMIN — APIXABAN 5 MG: 5 TABLET, FILM COATED ORAL at 18:06

## 2019-09-10 RX ADMIN — IRON SUCROSE 200 MG: 20 INJECTION, SOLUTION INTRAVENOUS at 05:57

## 2019-09-10 RX ADMIN — METOPROLOL SUCCINATE 100 MG: 50 TABLET, FILM COATED, EXTENDED RELEASE ORAL at 05:56

## 2019-09-10 RX ADMIN — LOSARTAN POTASSIUM 50 MG: 50 TABLET ORAL at 18:06

## 2019-09-10 RX ADMIN — LOSARTAN POTASSIUM 50 MG: 50 TABLET ORAL at 05:57

## 2019-09-10 ASSESSMENT — ENCOUNTER SYMPTOMS
DIZZINESS: 0
PHOTOPHOBIA: 0
PALPITATIONS: 0
DIAPHORESIS: 0
DEPRESSION: 0
HALLUCINATIONS: 0
FEVER: 0
WHEEZING: 0
SPUTUM PRODUCTION: 0
BLURRED VISION: 0
WEAKNESS: 1
NAUSEA: 0
BLOOD IN STOOL: 0
VOMITING: 0
SORE THROAT: 0
CHILLS: 0
EYE PAIN: 0
DOUBLE VISION: 0
FALLS: 1
SHORTNESS OF BREATH: 1
HEADACHES: 0
FLANK PAIN: 0
ABDOMINAL PAIN: 0
TINGLING: 0
COUGH: 1
PND: 0

## 2019-09-10 NOTE — CONSULTS
Reason for Consult:  Asked by Dr Yvette Dye M.D. to see this patient with Indeterminate troponin with prior heart disease history  Patient's PCP: Melina Figueroa P.A.-C.    CC: Falls  HPI: Sit very pleasant 86-year-old woman with history of paroxysmal atrial fibrillation on amiodarone anticoagulation who recently moved from [facility] to [facility] to be close to her family to help she is been having intermittent falls seems mechanical in nature and she established    Medications / Drug list prior to admission:  No current facility-administered medications on file prior to encounter.      Current Outpatient Medications on File Prior to Encounter   Medication Sig Dispense Refill   • rosuvastatin (CRESTOR) 10 MG Tab Take 10 mg by mouth every bedtime.     • apixaban (ELIQUIS) 5mg Tab Take 5 mg by mouth 2 Times a Day.     • Multiple Vitamins-Minerals (ICAPS AREDS 2 PO) Take 1 Cap by mouth 2 Times a Day.     • furosemide (LASIX) 40 MG Tab Take 40 mg by mouth 2 Times a Day.     • losartan (COZAAR) 50 MG Tab Take 50 mg by mouth 2 Times a Day.     • metoprolol SR (TOPROL XL) 100 MG TABLET SR 24 HR Take 100 mg by mouth every morning.     • amiodarone (CORDARONE) 200 MG Tab Take 200 mg by mouth every morning.     • potassium chloride SA (K-DUR) 10 MEQ Tab CR Take 10 mEq by mouth 2 times a day.         Current list of administered Medications:    Current Facility-Administered Medications:   •  iron sucrose (VENOFER) injection 200 mg, 200 mg, Intravenous, DAILY, Zoila Martinez M.D., 200 mg at 09/10/19 0557  •  amiodarone (CORDARONE) tablet 200 mg, 200 mg, Oral, Q DAY, Tim El M.D., 200 mg at 09/10/19 0557  •  apixaban (ELIQUIS) tablet 5 mg, 5 mg, Oral, BID, Tim El M.D., 5 mg at 09/10/19 0557  •  metoprolol SR (TOPROL XL) tablet 100 mg, 100 mg, Oral, Q DAY, Tim El M.D., 100 mg at 09/10/19 0556  •  potassium chloride SA (Kdur) tablet 10 mEq, 10 mEq, Oral,  DAILY, Tim El M.D., 10 mEq at 09/10/19 0556  •  rosuvastatin (CRESTOR) tablet 10 mg, 10 mg, Oral, Q EVENING, Tim El M.D., 10 mg at 09/09/19 1759  •  senna-docusate (PERICOLACE or SENOKOT S) 8.6-50 MG per tablet 2 Tab, 2 Tab, Oral, BID, 2 Tab at 09/09/19 1800 **AND** polyethylene glycol/lytes (MIRALAX) PACKET 1 Packet, 1 Packet, Oral, QDAY PRN **AND** magnesium hydroxide (MILK OF MAGNESIA) suspension 30 mL, 30 mL, Oral, QDAY PRN **AND** bisacodyl (DULCOLAX) suppository 10 mg, 10 mg, Rectal, QDAY PRN, Tim El M.D.  •  Respiratory Care per Protocol, , Nebulization, Continuous RT, Tim El M.D.  •  acetaminophen (TYLENOL) tablet 650 mg, 650 mg, Oral, Q6HRS PRN, Tim El M.D., 650 mg at 09/09/19 1921  •  ondansetron (ZOFRAN) syringe/vial injection 4 mg, 4 mg, Intravenous, Q4HRS PRN, Tim El M.D.  •  ondansetron (ZOFRAN ODT) dispertab 4 mg, 4 mg, Oral, Q4HRS PRN, Tim El M.D.  •  valACYclovir (VALTREX) caplet 1,000 mg, 1,000 mg, Oral, TID, Tim El M.D., 1,000 mg at 09/10/19 0556  •  losartan (COZAAR) tablet 50 mg, 50 mg, Oral, BID, Tim El M.D., 50 mg at 09/10/19 0557    No past medical history on file.    No past surgical history on file.    No family history on file.  Patient family history was personally reviewed, no pertinent family history to current presentation    Social History     Socioeconomic History   • Marital status:      Spouse name: Not on file   • Number of children: Not on file   • Years of education: Not on file   • Highest education level: Not on file   Occupational History   • Not on file   Social Needs   • Financial resource strain: Not on file   • Food insecurity:     Worry: Not on file     Inability: Not on file   • Transportation needs:     Medical: Not on file     Non-medical: Not on file   Tobacco Use   • Smoking status: Never Smoker   • Smokeless tobacco: Never Used    Substance and Sexual Activity   • Alcohol use: Not on file   • Drug use: Not on file   • Sexual activity: Not on file   Lifestyle   • Physical activity:     Days per week: Not on file     Minutes per session: Not on file   • Stress: Not on file   Relationships   • Social connections:     Talks on phone: Not on file     Gets together: Not on file     Attends Church service: Not on file     Active member of club or organization: Not on file     Attends meetings of clubs or organizations: Not on file     Relationship status: Not on file   • Intimate partner violence:     Fear of current or ex partner: Not on file     Emotionally abused: Not on file     Physically abused: Not on file     Forced sexual activity: Not on file   Other Topics Concern   • Not on file   Social History Narrative   • Not on file       ALLERGIES:  Allergies   Allergen Reactions   • Penicillins Hives     Rxn in the 60's per daughter       Review of systems:  A complete review of symptoms was reviewed with patient [  ]. This is reviewed in H&P and PMH. ALL OTHERS reviewed and negative    Physical exam:  Patient Vitals for the past 24 hrs:   BP Temp Temp src Pulse Resp SpO2 Weight   09/10/19 0713 142/64 36.8 °C (98.3 °F) Temporal 62 14 95 % --   09/10/19 0400 (!) 162/60 36 °C (96.8 °F) Temporal 64 18 97 % --   09/09/19 2300 131/48 36.4 °C (97.5 °F) Temporal 61 18 95 % --   09/09/19 1920 152/59 36.3 °C (97.3 °F) Temporal (!) 58 18 99 % 73.4 kg (161 lb 13.1 oz)   09/09/19 1622 129/53 37.4 °C (99.3 °F) Temporal 61 20 99 % --     General: No acute distress.   EYES: no jaundice  HEENT: OP clear   Neck: No bruits No JVD.   CVS:  [   ] RRR. S1 + S2. No M/R/G  Resp: CTAB. No wheezing or crackles/rhonchi.  Abdomen: Soft, NT, ND,  Skin: Grossly nothing acute no obvious rashes  Neurological: Alert, Moves all extremities, no cranial nerve defects on limited exam  Extremities: Pulse 2+ in b/l LE.  [   ] edema. No cyanosis.       Data:  Laboratory studies  personally reviewed by me:  Recent Results (from the past 24 hour(s))   EKG    Collection Time: 19  6:49 PM   Result Value Ref Range    Report       [facility]    Test Date:  2019  Pt Name:    BETHANY FARNSWORTH               Department: 171  MRN:        [id number]                      Room:       10  Gender:     Female                       Technician: [facility]  :        [id number]                   Requested By:ASHA SUAREZ  Order #:    [id number]                    Reading MD: Hair Walker MD    Measurements  Intervals                                Axis  Rate:       59                           P:          8  CA:         204                          QRS:        73  QRSD:       105                          T:          66  QT:         490  QTc:        486    Interpretive Statements  SINUS BRADYCARDIA  BORDERLINE ABNRM T, ANTEROLATERAL LEADS  BORDERLINE PROLONGED QT INTERVAL  No previous ECG available for comparison    Electronically Signed On 9- 5:51:04 PDT by Hair Walker MD     CBC WITH DIFFERENTIAL    Collection Time: 09/10/19  3:11 AM   Result Value Ref Range    WBC 5.3 4.8 - 10.8 K/uL    RBC 3.76 (L) 4.20 - 5.40 M/uL    Hemoglobin 8.4 (L) 12.0 - 16.0 g/dL    Hematocrit 30.9 (L) 37.0 - 47.0 %    MCV 82.2 81.4 - 97.8 fL    MCH 22.3 (L) 27.0 - 33.0 pg    MCHC 27.2 (L) 33.6 - 35.0 g/dL    RDW 56.0 (H) 35.9 - 50.0 fL    Platelet Count 176 164 - 446 K/uL    MPV 9.7 9.0 - 12.9 fL    Neutrophils-Polys 72.90 (H) 44.00 - 72.00 %    Lymphocytes 15.30 (L) 22.00 - 41.00 %    Monocytes 7.00 0.00 - 13.40 %    Eosinophils 3.80 0.00 - 6.90 %    Basophils 0.40 0.00 - 1.80 %    Immature Granulocytes 0.60 0.00 - 0.90 %    Nucleated RBC 0.00 /100 WBC    Neutrophils (Absolute) 3.86 2.00 - 7.15 K/uL    Lymphs (Absolute) 0.81 (L) 1.00 - 4.80 K/uL    Monos (Absolute) 0.37 0.00 - 0.85 K/uL    Eos (Absolute) 0.20 0.00 - 0.51 K/uL    Baso (Absolute) 0.02 0.00 - 0.12 K/uL    Immature Granulocytes  (abs) 0.03 0.00 - 0.11 K/uL    NRBC (Absolute) 0.00 K/uL   Comp Metabolic Panel    Collection Time: 09/10/19  3:11 AM   Result Value Ref Range    Sodium 142 135 - 145 mmol/L    Potassium 4.0 3.6 - 5.5 mmol/L    Chloride 107 96 - 112 mmol/L    Co2 30 20 - 33 mmol/L    Anion Gap 5.0 0.0 - 11.9    Glucose 117 (H) 65 - 99 mg/dL    Bun 27 (H) 8 - 22 mg/dL    Creatinine 1.00 0.50 - 1.40 mg/dL    Calcium 8.3 (L) 8.5 - 10.5 mg/dL    AST(SGOT) 24 12 - 45 U/L    ALT(SGPT) 24 2 - 50 U/L    Alkaline Phosphatase 70 30 - 99 U/L    Total Bilirubin 0.6 0.1 - 1.5 mg/dL    Albumin 3.2 3.2 - 4.9 g/dL    Total Protein 5.7 (L) 6.0 - 8.2 g/dL    Globulin 2.5 1.9 - 3.5 g/dL    A-G Ratio 1.3 g/dL   MAGNESIUM    Collection Time: 09/10/19  3:11 AM   Result Value Ref Range    Magnesium 2.3 1.5 - 2.5 mg/dL   PHOSPHORUS    Collection Time: 09/10/19  3:11 AM   Result Value Ref Range    Phosphorus 3.9 2.5 - 4.5 mg/dL   PROCALCITONIN    Collection Time: 09/10/19  3:11 AM   Result Value Ref Range    Procalcitonin <0.05 <0.25 ng/mL   ESTIMATED GFR    Collection Time: 09/10/19  3:11 AM   Result Value Ref Range    GFR If African American >60 >60 mL/min/1.73 m 2    GFR If Non  53 (A) >60 mL/min/1.73 m 2   URINE SODIUM RANDOM    Collection Time: 09/10/19 11:17 AM   Result Value Ref Range    Sodium, Urine -per volume 27 mmol/L   URINE POTASSIUM RANDOM    Collection Time: 09/10/19 11:17 AM   Result Value Ref Range    Potassium 76.6 mmol/L   URINE CHLORIDE RANDOM    Collection Time: 09/10/19 11:17 AM   Result Value Ref Range    Chloride, Urine-per volume 18 mmol/L   URINE CREATININE RANDOM    Collection Time: 09/10/19 11:17 AM   Result Value Ref Range    Creatinine, Random Urine 130.30 mg/dL   URINE PROTEIN RANDOM    Collection Time: 09/10/19 11:17 AM   Result Value Ref Range    Total Protein, Urine 50.2 (H) 0.0 - 15.0 mg/dL       Imaging:  EC-ECHOCARDIOGRAM COMPLETE W/O CONT    (Results Pending)           EKG : personally reviewed by me  sinus bradycardia QTc accepetable    CONCLUSIONS  No prior study is available for comparison.   Normal left ventricular systolic function.  Left ventricular ejection fraction is visually estimated to be 60%.  Grade III diastolic dysfunction.  Mild mitral regurgitation.  Mild to moderate tricuspid regurgitation.  Estimated right ventricular systolic pressure is 55 mmHg.        All pertinent features of laboratory and imaging reviewed including primary images where applicable      Principal Problem:    Falls frequently POA: Yes  Active Problems:    Chronic congestive heart failure (HCC) POA: Yes    Acute on chronic respiratory failure with hypoxia (HCC) POA: Unknown    Herpes zoster without complication POA: Unknown    Falls POA: Unknown    Paroxysmal atrial fibrillation (HCC) POA: Unknown    Elevated troponin POA: Unknown    Microcytic anemia POA: Unknown    TONY (acute kidney injury) (HCC) POA: Unknown    Encounter for monitoring amiodarone therapy (Chronic) POA: Yes  Resolved Problems:    * No resolved hospital problems. *      Assessment / Plan:  Indeterminate troponin level  Reviewed patient's care with the patient and her daughter would advocate conservative care no clear exacerbation of arrhythmias or CHF or coronary disease her troponins have been indeterminate    paf  I cannot exclude that her arrhythmia is contributing or leading to falls so I encouraged her to follow-up with Dr. Colón for may be an event monitor    Amio  I encouraged to follow-up with Dr. Colón on the possibility of amiodarone induced lung disease    Cardiology will sign off    I personally discussed her case with  Dr Yvette Dye M.D.    No future appointments.    It is my pleasure to participate in the care of Ms. Mcmanus.  Please do not hesitate to contact me with questions or concerns.    Amado Marquez MD PhD FACC  Cardiologist [facility]    9/10/2019    Please note that this dictation was  created using voice recognition software. I have worked with consultants from the vendor as well as technical experts from [facility] to optimize the interface. I have made every reasonable attempt to correct obvious errors, but I expect that there are errors of grammar and possibly content I did not discover before finalizing the note.

## 2019-09-10 NOTE — RESPIRATORY CARE
COPD EDUCATION by COPD CLINICAL EDUCATOR  9/10/2019 at 7:46 AM by Shanell Orellana     Patient reviewed by COPD education team. Patient does not qualify for the COPD program.  Patient does not have a history or diagnosis of COPD and is a non-smoker, therefore does not qualify for the COPD program.

## 2019-09-10 NOTE — PROGRESS NOTES
Pt in bed sleeping, wakes easily to voice. Pt does not need to urinate at this time, education provided on start of 24 hour urine as soon as she needs to go to the bathroom and pt verbalized understanding. No complaints of pain at this time. No signs or symptoms of resp distress noted or reported on RA. Bed in low and locked position, call button within reach and fall precautions are in place. Airborne and contact precautions remain in place.

## 2019-09-10 NOTE — PROGRESS NOTES
[facility] Medicine Daily Progress Note    Date of Service  9/9/2019    Chief Complaint  86 y.o. female admitted 9/8/2019 with history of atrial fibrillation, chronic hypoxic respiratory failure, comes in for recurrent falls, weakness and have a rational right lower extremity.    Hospital Course    Patient was a direct admission to the hospital for cardiology evaluation.  Upon arrival blood pressure was 161/62, pulse 70.  Patient was found to have pulmonary edema on chest x-ray.  She was started on IV Lasix for CHF.  She was started on Valcyte for herpes zoster.      Interval Problem Update  9/9: Patient is still on 5L of oxygen.  She states that she had mild improvement in her symptoms.  She found to be iron deficient and started IV Venofer.  Waiting on cardiac echo and cardiology recommendations.  She work with physical therapy today determined she would likely need SNF placement.    Consultants/Specialty  Cardiology     Code Status  DNR    Disposition  SNF    Review of Systems  Review of Systems   Constitutional: Positive for malaise/fatigue. Negative for chills, diaphoresis and fever.   HENT: Negative for congestion, ear discharge, ear pain, hearing loss, nosebleeds, sinus pain, sore throat and tinnitus.    Eyes: Negative for blurred vision, double vision, photophobia and pain.   Respiratory: Positive for cough and shortness of breath. Negative for hemoptysis, sputum production, wheezing and stridor.    Cardiovascular: Positive for leg swelling. Negative for chest pain, palpitations, orthopnea, claudication and PND.   Gastrointestinal: Negative for abdominal pain, blood in stool, constipation, diarrhea, heartburn, melena, nausea and vomiting.   Genitourinary: Negative for dysuria, flank pain, frequency, hematuria and urgency.   Musculoskeletal: Positive for falls. Negative for back pain, joint pain, myalgias and neck pain.   Skin: Negative for itching and rash.   Neurological: Positive for weakness. Negative for  dizziness, tingling, tremors and headaches.   Endo/Heme/Allergies: Negative for environmental allergies and polydipsia. Does not bruise/bleed easily.   Psychiatric/Behavioral: Negative for depression, hallucinations, substance abuse and suicidal ideas.        Physical Exam  Temp:  [36.7 °C (98.1 °F)-37.4 °C (99.4 °F)] 37.4 °C (99.3 °F)  Pulse:  [61-71] 61  Resp:  [16-22] 20  BP: (123-155)/(45-70) 129/53  SpO2:  [94 %-99 %] 99 %    Physical Exam   Constitutional: She is oriented to person, place, and time. She appears well-developed and well-nourished.   HENT:   Head: Normocephalic and atraumatic.   Mouth/Throat: No oropharyngeal exudate.   Eyes: Pupils are equal, round, and reactive to light. EOM are normal. No scleral icterus.   Neck: Normal range of motion. Neck supple. JVD present. No tracheal deviation present. No thyromegaly present.   Cardiovascular: Normal rate, regular rhythm and intact distal pulses. Exam reveals no gallop and no friction rub.   Murmur heard.  Pulmonary/Chest: Effort normal. No stridor. No respiratory distress. She has no wheezes. She has rales. She exhibits no tenderness.   Abdominal: Soft. Bowel sounds are normal. She exhibits no distension and no mass. There is no tenderness. There is no rebound and no guarding.   Musculoskeletal: Normal range of motion. She exhibits edema.   Lymphadenopathy:     She has no cervical adenopathy.   Neurological: She is alert and oriented to person, place, and time. She has normal reflexes. No cranial nerve deficit.   Skin: No rash noted. No erythema. No pallor.   Nursing note and vitals reviewed.      Fluids    Intake/Output Summary (Last 24 hours) at 9/9/2019 1828  Last data filed at 9/9/2019 0900  Gross per 24 hour   Intake 360 ml   Output 700 ml   Net -340 ml       Laboratory  Recent Labs     09/09/19  0057 09/09/19  0655   WBC 5.0 4.8   RBC 3.75* 3.82*   HEMOGLOBIN 8.6* 8.7*   HEMATOCRIT 31.1* 31.8*   MCV 82.9 83.2   MCH 22.9* 22.8*   MCHC 27.7* 27.4*    RDW 56.7* 56.1*   PLATELETCT 196 195   MPV 10.4 10.0     Recent Labs     09/09/19  0057 09/09/19  0655   SODIUM 144 146*   POTASSIUM 4.3 4.5   CHLORIDE 107 109   CO2 31 29   GLUCOSE 116* 117*   BUN 21 19   CREATININE 0.99 0.96   CALCIUM 8.4* 8.5                   Imaging  EC-ECHOCARDIOGRAM COMPLETE W/O CONT    (Results Pending)        Assessment/Plan  * Acute on chronic congestive heart failure (HCC)  Assessment & Plan  Unknown ejection fraction    We will check echocardiogram  I will switch her Lasix to IV and will monitor her intake and output electrolytes and renal function    TONY (acute kidney injury) ([facility])  Assessment & Plan  Could be prerenal versus cardiorenal syndrome.  Monitor BMP and assess response  Avoid IV contrast/nephrotoxins/NSAIDs  Dose adjust meds for decreased GFR        Microcytic anemia  Assessment & Plan  Stool for Hemoccult at the outlying facility was negative  Severe iron deficiency and I have started IV Venofer  Monitor hemoglobin and transfuse if less than 7    Elevated troponin  Assessment & Plan  Likely related to her CHF  Doubt acute coronary syndrome  Patient denies having any chest pain  Troponin trending down  Continue with current medical therapy and follow-up on echocardiogram  Dr. Avalos has been consulted by the transferring physician and will await his evaluation and further recommendations    Paroxysmal atrial fibrillation (HCC)  Assessment & Plan  Continue amiodarone and metoprolol  Continue Eliquis  We will try to obtain records from [place] cardiology and pulmonary  Patient's daughter reports that she had abnormal PFTs suggesting that she has 60% lung function may need to reevaluate amiodarone therapy after review of those records  We will defer to cardiology    Falls  Assessment & Plan  PT/OT eval's    Herpes zoster without complication  Assessment & Plan  I will start her on Valtrex 1 g 3 times daily for 7 days  Skin care    Acute on chronic respiratory failure with  hypoxia (HCC)  Assessment & Plan  Patient is currently on 5 L of oxygen  Monitor with diuresis       VTE prophylaxis: eliquis

## 2019-09-10 NOTE — CARE PLAN
Problem: Safety  Goal: Will remain free from injury  Outcome: PROGRESSING AS EXPECTED  Note:   Verified that safety precautions are in place and education provided to pt on fall safety and utilization of call button      Problem: Discharge Barriers/Planning  Goal: Patient's continuum of care needs will be met  Outcome: PROGRESSING AS EXPECTED  Intervention: Assess potential discharge barriers on admission and throughout hospital stay  Note:   RN will assess possible discharge barriers on admission and throughout hospital stay

## 2019-09-10 NOTE — ASSESSMENT & PLAN NOTE
Resolved. Consistent with cardiorenal, improved with diuresis  - Avoid IV contrast/nephrotoxins/NSAIDs

## 2019-09-10 NOTE — PROGRESS NOTES
Bed side report received from day shift RN. Pt alert sitting up in bed with daughter present. Pt medicated per orders for complaint of BLE pain. No signs or symptoms of resp distress noted or reported on RA. Bed in low and locked position, call button within reach and fall precautions are in place. RLE wrapped. Contact and airborne precautions remain in place

## 2019-09-10 NOTE — FACE TO FACE
Face to Face Supporting Documentation - Home Health    The encounter with this patient was in whole or in part the primary reason for home health admission.    Date of encounter:   Patient:                    MRN:                       YOB: 2019  Nuzhat Mcmanus  [id number]  2/24/1933     Home health to see patient for:  Physical Therapy evaluation and treatment and Occupational therapy evaluation and treatment    Skilled need for:  Recent Deterioration of Health Status congestive heart failure    Homebound status evidenced by:  Need the aid of supportive devices such as crutches, canes, wheelchairs or walkers or Needs the assistance of another person in order to leave the home. Leaving home requires a considerable and taxing effort. There is a normal inability to leave the home.    Community Physician to provide follow up care: Melina Figueroa P.A.-C.     Optional Interventions? No      I certify the face to face encounter for this home health care referral meets the CMS requirements and the encounter/clinical assessment with the patient was, in whole, or in part, for the medical condition(s) listed above, which is the primary reason for home health care. Based on my clinical findings: the service(s) are medically necessary, support the need for home health care, and the homebound criteria are met.  I certify that this patient has had a face to face encounter by myself.  Yvette Dye M.D. - NPI: [id number]

## 2019-09-10 NOTE — PROGRESS NOTES
[facility] Medicine Daily Progress Note    Date of Service  9/10/2019    Chief Complaint  86 y.o. female admitted 9/8/2019 with history of atrial fibrillation, chronic hypoxic respiratory failure, comes in for recurrent falls, weakness and have a rational right lower extremity.    Hospital Course    Patient was a direct admission to the hospital for cardiology evaluation.  Upon arrival blood pressure was 161/62, pulse 70.  Patient was found to have pulmonary edema on chest x-ray.  She was started on IV Lasix for CHF.  She was started on Valcyte for herpes zoster.      Interval Problem Update  9/9: Patient is still on 5L of oxygen.  She states that she had mild improvement in her symptoms.  She found to be iron deficient and started IV Venofer.  Waiting on cardiac echo and cardiology recommendations.  She work with physical therapy today determined she would likely need SNF placement.  9/10: No acute overnight events, feels improved. Eager to get home. Still on more than usual O2 requirement. Family at the bedside. Edema is improving.     Consultants/Specialty  Cardiology - discussed today    Code Status  DNR    Disposition  SNF    Review of Systems  Review of Systems   Constitutional: Positive for malaise/fatigue. Negative for chills, diaphoresis and fever.   HENT: Negative for congestion and sore throat.    Eyes: Negative for blurred vision, double vision, photophobia and pain.   Respiratory: Positive for cough and shortness of breath. Negative for sputum production and wheezing.    Cardiovascular: Positive for leg swelling. Negative for chest pain, palpitations and PND.   Gastrointestinal: Negative for abdominal pain, blood in stool, nausea and vomiting.   Genitourinary: Negative for dysuria, flank pain and hematuria.   Musculoskeletal: Positive for falls.   Skin: Negative for itching and rash.   Neurological: Positive for weakness. Negative for dizziness, tingling and headaches.   Psychiatric/Behavioral: Negative  for depression and hallucinations.   All other systems reviewed and are negative.       Physical Exam  Temp:  [36 °C (96.8 °F)-37.4 °C (99.4 °F)] 36 °C (96.8 °F)  Pulse:  [58-64] 64  Resp:  [18-22] 18  BP: (123-162)/(48-60) 162/60  SpO2:  [95 %-99 %] 97 %    Physical Exam   Constitutional: She is oriented to person, place, and time. She appears well-developed and well-nourished. No distress.   HENT:   Head: Normocephalic and atraumatic.   Mouth/Throat: No oropharyngeal exudate.   Eyes: EOM are normal. No scleral icterus.   Neck: Normal range of motion. Neck supple. JVD present. No tracheal deviation present.   Cardiovascular: Normal rate, regular rhythm and intact distal pulses. Exam reveals no gallop and no friction rub.   Murmur heard.  Pulmonary/Chest: Effort normal. No stridor. No respiratory distress. She has no wheezes. She has rales. She exhibits no tenderness.   Abdominal: Soft. She exhibits no distension. There is no tenderness. There is no guarding.   Musculoskeletal: Normal range of motion. She exhibits edema (improving). She exhibits no tenderness.   Neurological: She is alert and oriented to person, place, and time. She has normal reflexes. No cranial nerve deficit.   Skin: No rash noted. She is not diaphoretic. No erythema. No pallor.   Vitals reviewed.      Fluids    Intake/Output Summary (Last 24 hours) at 9/10/2019 0758  Last data filed at 9/9/2019 1830  Gross per 24 hour   Intake 450 ml   Output 500 ml   Net -50 ml       Laboratory  Recent Labs     09/09/19  0057 09/09/19  0655 09/10/19  0311   WBC 5.0 4.8 5.3   RBC 3.75* 3.82* 3.76*   HEMOGLOBIN 8.6* 8.7* 8.4*   HEMATOCRIT 31.1* 31.8* 30.9*   MCV 82.9 83.2 82.2   MCH 22.9* 22.8* 22.3*   MCHC 27.7* 27.4* 27.2*   RDW 56.7* 56.1* 56.0*   PLATELETCT 196 195 176   MPV 10.4 10.0 9.7     Recent Labs     09/09/19  0057 09/09/19  0655 09/10/19  0311   SODIUM 144 146* 142   POTASSIUM 4.3 4.5 4.0   CHLORIDE 107 109 107   CO2 31 29 30   GLUCOSE 116* 117* 117*    BUN 21 19 27*   CREATININE 0.99 0.96 1.00   CALCIUM 8.4* 8.5 8.3*                   Imaging  EC-ECHOCARDIOGRAM COMPLETE W/O CONT    (Results Pending)        Assessment/Plan  * Acute on chronic diastolic congestive heart failure (HCC)- (present on admission)  Assessment & Plan  Normal EF of 60% with grade 3 diastolic dysfunction.  - s/p IV lasix  - continue home regimen of PO lasix tomorrow  - cardiology evaluated, appreciate recs  - correct electrolytes  - medical management     Elevated troponin- (present on admission)  Assessment & Plan  Likely related to her CHF. Type II demand.   - medical managmeent  - Dr. Avalos had been consulted by the transferring physician   - evaluated by cardiology today, appreciate recs    Paroxysmal atrial fibrillation (HCC)  Assessment & Plan  - Continue amiodarone and metoprolol, eliquis  - f/u records from [facility]  - continue outpatient follow up    TONY (acute kidney injury) ([facility])- (present on admission)  Assessment & Plan  Could be prerenal versus cardiorenal syndrome. Stable.  - Avoid IV contrast/nephrotoxins/NSAIDs    Microcytic anemia  Assessment & Plan  Stool for Hemoccult at the outlying facility was negative. Severe iron deficiency   - IV Venofer  - Monitor hemoglobin and transfuse if less than 7    Falls- (present on admission)  Assessment & Plan  PT/OT eval's    Herpes zoster without complication- (present on admission)  Assessment & Plan  I will start her on Valtrex 1 g 3 times daily for 7 days  Skin care    Acute on chronic respiratory failure with hypoxia (HCC)- (present on admission)  Assessment & Plan  Patient is currently on 4-5 L of oxygen  Monitor with diuresis       VTE prophylaxis: eliquis
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
## 2019-09-11 LAB
ALBUMIN SERPL BCP-MCNC: 3.6 G/DL (ref 3.2–4.9)
ALBUMIN/GLOB SERPL: 1.4 G/DL
ALP SERPL-CCNC: 80 U/L (ref 30–99)
ALT SERPL-CCNC: 25 U/L (ref 2–50)
ANION GAP SERPL CALC-SCNC: 7 MMOL/L (ref 0–11.9)
AST SERPL-CCNC: 25 U/L (ref 12–45)
BASOPHILS # BLD AUTO: 0.6 % (ref 0–1.8)
BASOPHILS # BLD: 0.04 K/UL (ref 0–0.12)
BILIRUB SERPL-MCNC: 0.7 MG/DL (ref 0.1–1.5)
BUN SERPL-MCNC: 22 MG/DL (ref 8–22)
CALCIUM SERPL-MCNC: 8.6 MG/DL (ref 8.5–10.5)
CHLORIDE SERPL-SCNC: 102 MMOL/L (ref 96–112)
CO2 SERPL-SCNC: 26 MMOL/L (ref 20–33)
CREAT SERPL-MCNC: 1.11 MG/DL (ref 0.5–1.4)
EOSINOPHIL # BLD AUTO: 0.13 K/UL (ref 0–0.51)
EOSINOPHIL NFR BLD: 2 % (ref 0–6.9)
ERYTHROCYTE [DISTWIDTH] IN BLOOD BY AUTOMATED COUNT: 57.4 FL (ref 35.9–50)
GLOBULIN SER CALC-MCNC: 2.6 G/DL (ref 1.9–3.5)
GLUCOSE SERPL-MCNC: 125 MG/DL (ref 65–99)
HCT VFR BLD AUTO: 36.5 % (ref 37–47)
HGB BLD-MCNC: 9.6 G/DL (ref 12–16)
IMM GRANULOCYTES # BLD AUTO: 0.05 K/UL (ref 0–0.11)
IMM GRANULOCYTES NFR BLD AUTO: 0.8 % (ref 0–0.9)
LYMPHOCYTES # BLD AUTO: 0.75 K/UL (ref 1–4.8)
LYMPHOCYTES NFR BLD: 11.8 % (ref 22–41)
MCH RBC QN AUTO: 22.4 PG (ref 27–33)
MCHC RBC AUTO-ENTMCNC: 26.3 G/DL (ref 33.6–35)
MCV RBC AUTO: 85.3 FL (ref 81.4–97.8)
MONOCYTES # BLD AUTO: 0.41 K/UL (ref 0–0.85)
MONOCYTES NFR BLD AUTO: 6.4 % (ref 0–13.4)
NEUTROPHILS # BLD AUTO: 5 K/UL (ref 2–7.15)
NEUTROPHILS NFR BLD: 78.4 % (ref 44–72)
NRBC # BLD AUTO: 0 K/UL
NRBC BLD-RTO: 0 /100 WBC
PLATELET # BLD AUTO: 200 K/UL (ref 164–446)
PMV BLD AUTO: 10.4 FL (ref 9–12.9)
POTASSIUM SERPL-SCNC: 4.3 MMOL/L (ref 3.6–5.5)
PROT SERPL-MCNC: 6.2 G/DL (ref 6–8.2)
RBC # BLD AUTO: 4.28 M/UL (ref 4.2–5.4)
SODIUM SERPL-SCNC: 135 MMOL/L (ref 135–145)
WBC # BLD AUTO: 6.4 K/UL (ref 4.8–10.8)

## 2019-09-11 PROCEDURE — 700102 HCHG RX REV CODE 250 W/ 637 OVERRIDE(OP): Performed by: HOSPITALIST

## 2019-09-11 PROCEDURE — A9270 NON-COVERED ITEM OR SERVICE: HCPCS | Performed by: HOSPITALIST

## 2019-09-11 PROCEDURE — 700111 HCHG RX REV CODE 636 W/ 250 OVERRIDE (IP): Performed by: HOSPITALIST

## 2019-09-11 PROCEDURE — 770020 HCHG ROOM/CARE - TELE (206)

## 2019-09-11 PROCEDURE — 99232 SBSQ HOSP IP/OBS MODERATE 35: CPT | Performed by: HOSPITALIST

## 2019-09-11 PROCEDURE — 80053 COMPREHEN METABOLIC PANEL: CPT

## 2019-09-11 PROCEDURE — 85025 COMPLETE CBC W/AUTO DIFF WBC: CPT

## 2019-09-11 PROCEDURE — 36415 COLL VENOUS BLD VENIPUNCTURE: CPT

## 2019-09-11 RX ORDER — FUROSEMIDE 20 MG/1
20 TABLET ORAL
Status: DISCONTINUED | OUTPATIENT
Start: 2019-09-11 | End: 2019-09-11

## 2019-09-11 RX ORDER — FUROSEMIDE 40 MG/1
40 TABLET ORAL
Status: DISCONTINUED | OUTPATIENT
Start: 2019-09-12 | End: 2019-09-13 | Stop reason: HOSPADM

## 2019-09-11 RX ADMIN — POTASSIUM CHLORIDE 10 MEQ: 20 TABLET, EXTENDED RELEASE ORAL at 05:26

## 2019-09-11 RX ADMIN — IRON SUCROSE 200 MG: 20 INJECTION, SOLUTION INTRAVENOUS at 05:27

## 2019-09-11 RX ADMIN — VALACYCLOVIR HYDROCHLORIDE 1000 MG: 500 TABLET, FILM COATED ORAL at 05:26

## 2019-09-11 RX ADMIN — SENNOSIDES, DOCUSATE SODIUM 2 TABLET: 50; 8.6 TABLET, FILM COATED ORAL at 05:26

## 2019-09-11 RX ADMIN — LOSARTAN POTASSIUM 50 MG: 50 TABLET ORAL at 05:26

## 2019-09-11 RX ADMIN — ROSUVASTATIN CALCIUM 10 MG: 20 TABLET, FILM COATED ORAL at 17:03

## 2019-09-11 RX ADMIN — AMIODARONE HYDROCHLORIDE 200 MG: 200 TABLET ORAL at 05:26

## 2019-09-11 RX ADMIN — VALACYCLOVIR HYDROCHLORIDE 1000 MG: 500 TABLET, FILM COATED ORAL at 11:46

## 2019-09-11 RX ADMIN — APIXABAN 5 MG: 5 TABLET, FILM COATED ORAL at 05:26

## 2019-09-11 RX ADMIN — APIXABAN 5 MG: 5 TABLET, FILM COATED ORAL at 17:04

## 2019-09-11 RX ADMIN — METOPROLOL SUCCINATE 100 MG: 50 TABLET, FILM COATED, EXTENDED RELEASE ORAL at 05:26

## 2019-09-11 RX ADMIN — VALACYCLOVIR HYDROCHLORIDE 1000 MG: 500 TABLET, FILM COATED ORAL at 17:04

## 2019-09-11 RX ADMIN — LOSARTAN POTASSIUM 50 MG: 50 TABLET ORAL at 17:04

## 2019-09-11 ASSESSMENT — CHA2DS2 SCORE
PRIOR STROKE OR TIA OR THROMBOEMBOLISM: NO
HYPERTENSION: NO
CHA2DS2 VASC SCORE: 4
AGE 65 TO 74: NO
CHF OR LEFT VENTRICULAR DYSFUNCTION: YES
SEX: FEMALE
VASCULAR DISEASE: NO
DIABETES: NO
AGE 75 OR GREATER: YES

## 2019-09-11 ASSESSMENT — ENCOUNTER SYMPTOMS
COUGH: 1
FEVER: 0
BLURRED VISION: 0
HALLUCINATIONS: 0
WEAKNESS: 1
DIZZINESS: 0
SEIZURES: 0
DEPRESSION: 0
PALPITATIONS: 0
TINGLING: 0
ABDOMINAL PAIN: 0
NAUSEA: 0
SORE THROAT: 0
FLANK PAIN: 0
SPUTUM PRODUCTION: 0
HEADACHES: 0
EYE PAIN: 0
VOMITING: 0
WHEEZING: 0
CHILLS: 0
FALLS: 1
SHORTNESS OF BREATH: 1
BLOOD IN STOOL: 0

## 2019-09-11 NOTE — PROGRESS NOTES
[facility] Medicine Daily Progress Note    Date of Service  9/11/2019    Chief Complaint  86 y.o. female admitted 9/8/2019 with history of atrial fibrillation, chronic hypoxic respiratory failure, comes in for recurrent falls, weakness and have a rational right lower extremity.    Hospital Course    Patient was a direct admission to the hospital for cardiology evaluation.  Upon arrival blood pressure was 161/62, pulse 70.  Patient was found to have pulmonary edema on chest x-ray.  She was started on IV Lasix for CHF.  She was started on Valcyte for herpes zoster.      Interval Problem Update  9/9: Patient is still on 5L of oxygen.  She states that she had mild improvement in her symptoms.  She found to be iron deficient and started IV Venofer.  Waiting on cardiac echo and cardiology recommendations.  She work with physical therapy today determined she would likely need SNF placement.  9/10: No acute overnight events, feels improved. Eager to get home. Still on more than usual O2 requirement. Family at the bedside. Edema is improving.   9/11: Oxygen requirement reduced to baseline 3L. Feels improved, just with some weakness. No overnight events.     Consultants/Specialty  Cardiology - discussed today    Code Status  DNR    Disposition  SNF when accepted.     Review of Systems  Review of Systems   Constitutional: Positive for malaise/fatigue. Negative for chills and fever.   HENT: Negative for congestion and sore throat.    Eyes: Negative for blurred vision and pain.   Respiratory: Positive for cough and shortness of breath. Negative for sputum production and wheezing.    Cardiovascular: Positive for leg swelling. Negative for chest pain and palpitations.   Gastrointestinal: Negative for abdominal pain, blood in stool, nausea and vomiting.   Genitourinary: Negative for dysuria, flank pain and hematuria.   Musculoskeletal: Positive for falls.   Skin: Positive for rash.   Neurological: Positive for weakness. Negative for  dizziness, tingling, seizures and headaches.   Psychiatric/Behavioral: Negative for depression and hallucinations.   All other systems reviewed and are negative.       Physical Exam  Temp:  [36.1 °C (97 °F)-36.7 °C (98 °F)] 36.3 °C (97.3 °F)  Pulse:  [56-65] 65  Resp:  [16-18] 18  BP: (117-168)/(50-65) 168/65  SpO2:  [91 %-99 %] 98 %    Physical Exam   Constitutional: She appears well-developed. No distress.   HENT:   Head: Normocephalic and atraumatic.   Mouth/Throat: No oropharyngeal exudate.   Eyes: EOM are normal. No scleral icterus.   Neck: Normal range of motion. Neck supple. JVD present. No tracheal deviation present.   Cardiovascular: Normal rate, regular rhythm and intact distal pulses. Exam reveals no gallop and no friction rub.   Murmur heard.  Pulmonary/Chest: Effort normal. No stridor. No respiratory distress. She has no wheezes. She has rales.   Abdominal: Soft. She exhibits no distension. There is no tenderness.   Musculoskeletal: Normal range of motion. She exhibits edema (improving). She exhibits no tenderness.   Neurological: She is alert. She has normal reflexes. No cranial nerve deficit.   Generalized weakness   Skin: Rash (lesions on right knee) noted. She is not diaphoretic. No pallor.   Psychiatric: She has a normal mood and affect. Her speech is delayed.   Vitals reviewed.      Fluids    Intake/Output Summary (Last 24 hours) at 9/11/2019 0746  Last data filed at 9/10/2019 1200  Gross per 24 hour   Intake 480 ml   Output --   Net 480 ml       Laboratory  Recent Labs     09/09/19  0655 09/10/19  0311 09/11/19  0306   WBC 4.8 5.3 6.4   RBC 3.82* 3.76* 4.28   HEMOGLOBIN 8.7* 8.4* 9.6*   HEMATOCRIT 31.8* 30.9* 36.5*   MCV 83.2 82.2 85.3   MCH 22.8* 22.3* 22.4*   MCHC 27.4* 27.2* 26.3*   RDW 56.1* 56.0* 57.4*   PLATELETCT 195 176 200   MPV 10.0 9.7 10.4     Recent Labs     09/09/19  0655 09/10/19  0311 09/11/19  0306   SODIUM 146* 142 135   POTASSIUM 4.5 4.0 4.3   CHLORIDE 109 107 102   CO2 29 30  26   GLUCOSE 117* 117* 125*   BUN 19 27* 22   CREATININE 0.96 1.00 1.11   CALCIUM 8.5 8.3* 8.6                   Imaging  EC-ECHOCARDIOGRAM COMPLETE W/O CONT   Final Result           Assessment/Plan  * Acute on chronic diastolic congestive heart failure (HCC)- (present on admission)  Assessment & Plan  Normal EF of 60% with grade 3 diastolic dysfunction.  - s/p IV lasix  - resume home regimen of PO lasix tomorrow  - cardiology evaluated, appreciate recs  - correct electrolytes  - medical management   - outpatient follow up with Cardiology at [facility]  - I/Os, daily weights    Elevated troponin- (present on admission)  Assessment & Plan  Likely related to her CHF. Type II demand.   - medical management  - evaluated by cardiology today, appreciate recs    Paroxysmal atrial fibrillation (HCC)- (present on admission)  Assessment & Plan  - Continue amiodarone and metoprolol, eliquis  - f/u records from [facility]  - continue outpatient follow up    TONY (acute kidney injury) ([facility])- (present on admission)  Assessment & Plan  Could be prerenal versus cardiorenal syndrome. Stable.  - Avoid IV contrast/nephrotoxins/NSAIDs    Microcytic anemia  Assessment & Plan  Stool for Hemoccult at the outlying facility was negative. Severe iron deficiency   - IV Venofer  - Monitor hemoglobin and transfuse if less than 7    Herpes zoster without complication- (present on admission)  Assessment & Plan  - Valtrex 1 g 3 times daily for 7 days  - s/p wound care    Acute on chronic respiratory failure with hypoxia (HCC)- (present on admission)  Assessment & Plan  Patient requiring 4-5L, above baseline  - Monitor with diuresis  - wean O2 as able, RT protocol       VTE prophylaxis: eliquis

## 2019-09-11 NOTE — DISCHARGE PLANNING
Agency/Facility Name: [facility]  Spoke To: Clarissa   Outcome: Patient accepted.     Notified SANJAY Ibarra.

## 2019-09-11 NOTE — DISCHARGE PLANNING
Received Choice form at 8073  Agency/Facility Name: [facility]  Referral sent per Choice form @ 9716

## 2019-09-11 NOTE — CARE PLAN
Problem: Safety  Goal: Will remain free from injury  Outcome: PROGRESSING AS EXPECTED  Pt educated on safety precautions and precautions in place. Treaded socks on, bed locked and in lowest position, belongings and call light within reach. Bed alarm in place and on.        Problem: Knowledge Deficit  Goal: Knowledge of disease process/condition, treatment plan, diagnostic tests, and medications will improve  Outcome: PROGRESSING AS EXPECTED   Patient educated on disease process, treatment plan, medications, and plan of care for today. Patient verbalized understanding and no additional questions at this time.

## 2019-09-11 NOTE — THERAPY
Occupational Therapy Contact Note    OT tx attempted. Pt family member requesting pt rest as pt had just returned to bed. Will re-attempt as able.    Kavitha Salazar, OTR/L

## 2019-09-11 NOTE — DISCHARGE PLANNING
ANY discussed HH with KALYANI Billingsley to find out what HH agency can serve the [facility]. A company called [facility] states that they normally do but have not had staff in months to travel there.   ANY discussed with Dr. Dye and she states that pt will most likely need SNF now.   CM met with pt and her dtr Giovanna at bedside. They would like to choose [facility] as pt's granddtrs live near to there and it will be easy for them to visit.   ANY completed SNF choice form and sent to [facility].   Plan: Monitor for acceptance to [facility] and check for PASRR.   Care Transition Team Assessment    Information Source  Orientation : Disoriented to Place, Disoriented to Time  Information Given By: Patient, Relative  Informant's Name: Giovanna Noland  Who is responsible for making decisions for patient? : Patient         Elopement Risk  Legal Hold: No  Ambulatory or Self Mobile in Wheelchair: Yes  Disoriented: Time-At Risk for Elopement, Place-At Risk for Elopement  Psychiatric Symptoms: None  History of Wandering: No  Elopement this Admit: No  Vocalizing Wanting to Leave: No  Displays Behaviors, Body Language Wanting to Leave: No-Not at Risk for Elopement  Elopement Risk: Not at Risk for Elopement    Interdisciplinary Discharge Planning  Primary Care Physician: Melina ALCALA  Lives with - Patient's Self Care Capacity: Adult Children  Patient or legal guardian wants to designate a caregiver (see row info): Yes  Caregiver name: Giovanna Yeung  Caregiver relationship to patient: Daughter  Housing / Facility: Mobile Home  Do You Take your Prescribed Medications Regularly: Yes  Able to Return to Previous ADL's: Future Time w/Therapy  Mobility Issues: Yes  Prior Services: Intermittent Physical Support for ADL Per Family  Patient Expects to be Discharged to:: SNF  Assistance Needed: Yes  Durable Medical Equipment: Home Oxygen, Walker  DME Provider / Phone: [facility]    Discharge Preparedness  What is your plan after discharge?:  Skilled nursing facility  What are your discharge supports?: Child  Prior Functional Level: Ambulatory, Needs Assist with Activities of Daily Living, Needs Assist with Medication Management, Uses Walker    Functional Assesment  Prior Functional Level: Ambulatory, Needs Assist with Activities of Daily Living, Needs Assist with Medication Management, Uses Walker         Vision / Hearing Impairment  Vision Impairment : Yes  Right Eye Vision: Impaired, Wears Glasses  Left Eye Vision: Impaired, Wears Glasses  Hearing Impairment : Yes  Hearing Impairment: Patient Declines to Wear Hearing Device(s)  Does Pt Need Special Equipment for the Hearing Impaired?: No              Domestic Abuse  Have you ever been the victim of abuse or violence?: No  Physical Abuse or Sexual Abuse: No  Verbal Abuse or Emotional Abuse: No  Possible Abuse Reported to:: Not Applicable              Anticipated Discharge Information  Anticipated discharge disposition: SNF

## 2019-09-11 NOTE — HEART FAILURE PROGRAM
Patient recently moved from [place], [place] to be closer to family after her spouse passed.    Admitted with frequent falls. Found to have Grade III DD. Cardiology was consulted for her new diagnosis of HFpEF (60%).    Looks like patient is going to [facility]. I've asked [facility] to arrange HF appt for after SNF.    HF Measures:  1. Documentation of LV systolic function (echo or cath) PTA, during this hospitalization, or plan to assess post discharge or reason for not assessing documented.  2. ACE-I, ARNI or ARB prescribed on discharge for LVEF <40%  3. For HF patients with LVEF less than or equal to 40% evidence based beta blocker must be prescribed upon discharge one of the following: carvedilol, bisoprolol, Toprol XL  4. For EF less than or equal to 35% aldosterone blockade prescribed upon discharge  5. Nutrition consult for diet education  6. HF education documented daily  7. Screening for and administering immunizations as long as no contraindications: Pneumonia and Influenza  8. Written discharge instructions include:  ? Daily weights  ? Record weight on tracker  ? Bring tracker to appointments  ? Call MD for weight gain of 3lb /day or 5lb/week  ? HF medication teaching  ? Low sodium diet  ? Follow up appointment within seven calendar days of d/c must include: date, time and location  ? Activity  ? Worsening symptoms  What if any of the above HF measures are contraindicated?  ? Request that the discharging provider document the medication/intervention and the contraindication specifically in a progress note  ? For example: “no CHF meds due to hypotension” is not enough. It needs to say: “No ACE-I, ARNI, ARB due to hypotension”; “No Beta Blockade due to bradycardia”…     Thank you, Sarahy, Cardiovascular Nurse Navigator, RN, CHFN [phone]

## 2019-09-11 NOTE — PROGRESS NOTES
Report received at bedside from NOC RN, pt care assumed, tele box on. No signs of distress noted at this time, on 5L of O2, will titrate as tolerated. Patient resting comfortably in bed. POC discussed with pt and verbalizes no questions.  Pt denies any additional needs at this time. Bed in lowest position, pt educated on fall risk and verbalized understanding, call light within reach, bed alarm plugged in and on.

## 2019-09-11 NOTE — WOUND TEAM
"Wound consult placed on 09/09/2019 at 0242 regarding \"Shingles Blisters.\" Chart reviewed, no images in Epic. This RN up to assess patient. Discussed with Cyndie bedside RN who states pt has completed her course of Valtrex and is no longer isolated. This RN in to see patient in T710/00. Daughter at bedside, Leg with roll gauze and coban falling off. This RN removed dressing and obtained images. Leg was then rewrapped using adaptic over blisters followed by roll gauze and hypafix tape. Discussed use of ABD pads if wounds begin to weep, also discussed use of sacral mepilex as are decreases in size and wounds heal. Pt and bedside RN in agreement. Supplies for dressing changes left at bedside. No advanced wound care needs indicated. Wound consult completed. No further follow up unless consulted.             "

## 2019-09-12 LAB
ALBUMIN SERPL BCP-MCNC: 3.4 G/DL (ref 3.2–4.9)
ALBUMIN/GLOB SERPL: 1.3 G/DL
ALP SERPL-CCNC: 66 U/L (ref 30–99)
ALT SERPL-CCNC: 23 U/L (ref 2–50)
ANION GAP SERPL CALC-SCNC: 10 MMOL/L (ref 0–11.9)
AST SERPL-CCNC: 25 U/L (ref 12–45)
BASOPHILS # BLD AUTO: 0.5 % (ref 0–1.8)
BASOPHILS # BLD: 0.03 K/UL (ref 0–0.12)
BILIRUB SERPL-MCNC: 0.8 MG/DL (ref 0.1–1.5)
BUN SERPL-MCNC: 17 MG/DL (ref 8–22)
CALCIUM SERPL-MCNC: 8.4 MG/DL (ref 8.5–10.5)
CHLORIDE SERPL-SCNC: 105 MMOL/L (ref 96–112)
CO2 SERPL-SCNC: 23 MMOL/L (ref 20–33)
CREAT SERPL-MCNC: 0.9 MG/DL (ref 0.5–1.4)
EOSINOPHIL # BLD AUTO: 0.12 K/UL (ref 0–0.51)
EOSINOPHIL NFR BLD: 2.1 % (ref 0–6.9)
ERYTHROCYTE [DISTWIDTH] IN BLOOD BY AUTOMATED COUNT: 52.9 FL (ref 35.9–50)
GLOBULIN SER CALC-MCNC: 2.7 G/DL (ref 1.9–3.5)
GLUCOSE SERPL-MCNC: 118 MG/DL (ref 65–99)
HCT VFR BLD AUTO: 32.9 % (ref 37–47)
HGB BLD-MCNC: 9.5 G/DL (ref 12–16)
IMM GRANULOCYTES # BLD AUTO: 0.06 K/UL (ref 0–0.11)
IMM GRANULOCYTES NFR BLD AUTO: 1 % (ref 0–0.9)
LYMPHOCYTES # BLD AUTO: 0.65 K/UL (ref 1–4.8)
LYMPHOCYTES NFR BLD: 11.1 % (ref 22–41)
MCH RBC QN AUTO: 23.1 PG (ref 27–33)
MCHC RBC AUTO-ENTMCNC: 28.9 G/DL (ref 33.6–35)
MCV RBC AUTO: 80 FL (ref 81.4–97.8)
MONOCYTES # BLD AUTO: 0.41 K/UL (ref 0–0.85)
MONOCYTES NFR BLD AUTO: 7 % (ref 0–13.4)
NEUTROPHILS # BLD AUTO: 4.58 K/UL (ref 2–7.15)
NEUTROPHILS NFR BLD: 78.3 % (ref 44–72)
NRBC # BLD AUTO: 0 K/UL
NRBC BLD-RTO: 0 /100 WBC
PLATELET # BLD AUTO: 171 K/UL (ref 164–446)
PMV BLD AUTO: 10.5 FL (ref 9–12.9)
POTASSIUM SERPL-SCNC: 4.2 MMOL/L (ref 3.6–5.5)
PROT SERPL-MCNC: 6.1 G/DL (ref 6–8.2)
RBC # BLD AUTO: 4.11 M/UL (ref 4.2–5.4)
SODIUM SERPL-SCNC: 138 MMOL/L (ref 135–145)
WBC # BLD AUTO: 5.9 K/UL (ref 4.8–10.8)

## 2019-09-12 PROCEDURE — A9270 NON-COVERED ITEM OR SERVICE: HCPCS | Performed by: HOSPITALIST

## 2019-09-12 PROCEDURE — 99232 SBSQ HOSP IP/OBS MODERATE 35: CPT | Performed by: HOSPITALIST

## 2019-09-12 PROCEDURE — 700102 HCHG RX REV CODE 250 W/ 637 OVERRIDE(OP): Performed by: HOSPITALIST

## 2019-09-12 PROCEDURE — 36415 COLL VENOUS BLD VENIPUNCTURE: CPT

## 2019-09-12 PROCEDURE — 80053 COMPREHEN METABOLIC PANEL: CPT

## 2019-09-12 PROCEDURE — 85025 COMPLETE CBC W/AUTO DIFF WBC: CPT

## 2019-09-12 PROCEDURE — 770020 HCHG ROOM/CARE - TELE (206)

## 2019-09-12 PROCEDURE — 97530 THERAPEUTIC ACTIVITIES: CPT

## 2019-09-12 PROCEDURE — 700111 HCHG RX REV CODE 636 W/ 250 OVERRIDE (IP): Performed by: HOSPITALIST

## 2019-09-12 RX ADMIN — ACETAMINOPHEN 650 MG: 325 TABLET, FILM COATED ORAL at 17:30

## 2019-09-12 RX ADMIN — APIXABAN 5 MG: 5 TABLET, FILM COATED ORAL at 05:53

## 2019-09-12 RX ADMIN — FUROSEMIDE 40 MG: 40 TABLET ORAL at 05:54

## 2019-09-12 RX ADMIN — VALACYCLOVIR HYDROCHLORIDE 1000 MG: 500 TABLET, FILM COATED ORAL at 17:28

## 2019-09-12 RX ADMIN — VALACYCLOVIR HYDROCHLORIDE 1000 MG: 500 TABLET, FILM COATED ORAL at 11:32

## 2019-09-12 RX ADMIN — LOSARTAN POTASSIUM 50 MG: 50 TABLET ORAL at 05:53

## 2019-09-12 RX ADMIN — VALACYCLOVIR HYDROCHLORIDE 1000 MG: 500 TABLET, FILM COATED ORAL at 05:53

## 2019-09-12 RX ADMIN — IRON SUCROSE 200 MG: 20 INJECTION, SOLUTION INTRAVENOUS at 05:53

## 2019-09-12 RX ADMIN — LOSARTAN POTASSIUM 50 MG: 50 TABLET ORAL at 17:28

## 2019-09-12 RX ADMIN — METOPROLOL SUCCINATE 100 MG: 50 TABLET, FILM COATED, EXTENDED RELEASE ORAL at 05:54

## 2019-09-12 RX ADMIN — AMIODARONE HYDROCHLORIDE 200 MG: 200 TABLET ORAL at 05:53

## 2019-09-12 RX ADMIN — ROSUVASTATIN CALCIUM 10 MG: 20 TABLET, FILM COATED ORAL at 17:28

## 2019-09-12 RX ADMIN — APIXABAN 5 MG: 5 TABLET, FILM COATED ORAL at 17:27

## 2019-09-12 RX ADMIN — FUROSEMIDE 40 MG: 40 TABLET ORAL at 17:28

## 2019-09-12 RX ADMIN — POTASSIUM CHLORIDE 10 MEQ: 20 TABLET, EXTENDED RELEASE ORAL at 05:54

## 2019-09-12 ASSESSMENT — ENCOUNTER SYMPTOMS
SEIZURES: 0
FEVER: 0
CHILLS: 0
WHEEZING: 0
SORE THROAT: 0
DIZZINESS: 0
SPUTUM PRODUCTION: 0
ABDOMINAL PAIN: 0
COUGH: 1
FLANK PAIN: 0
BLURRED VISION: 0
SHORTNESS OF BREATH: 1
VOMITING: 0
NAUSEA: 0
EYE DISCHARGE: 0
PALPITATIONS: 0
HALLUCINATIONS: 0
DEPRESSION: 0
BLOOD IN STOOL: 0
FALLS: 1
EYE PAIN: 0
WEAKNESS: 1
HEADACHES: 0

## 2019-09-12 ASSESSMENT — COGNITIVE AND FUNCTIONAL STATUS - GENERAL
WALKING IN HOSPITAL ROOM: A LITTLE
MOBILITY SCORE: 16
CLIMB 3 TO 5 STEPS WITH RAILING: A LITTLE
SUGGESTED CMS G CODE MODIFIER MOBILITY: CK
MOVING FROM LYING ON BACK TO SITTING ON SIDE OF FLAT BED: A LITTLE
MOVING TO AND FROM BED TO CHAIR: UNABLE
TURNING FROM BACK TO SIDE WHILE IN FLAT BAD: A LITTLE
STANDING UP FROM CHAIR USING ARMS: A LITTLE

## 2019-09-12 ASSESSMENT — GAIT ASSESSMENTS
DISTANCE (FEET): 150
GAIT LEVEL OF ASSIST: MINIMAL ASSIST
DEVIATION: BRADYKINETIC
ASSISTIVE DEVICE: FRONT WHEEL WALKER

## 2019-09-12 NOTE — PROGRESS NOTES
[facility] Medicine Daily Progress Note    Date of Service  9/12/2019    Chief Complaint  86 y.o. female admitted 9/8/2019 with history of atrial fibrillation, chronic hypoxic respiratory failure, comes in for recurrent falls, weakness and have a rational right lower extremity.    Hospital Course    Patient was a direct admission to the hospital for cardiology evaluation.  Upon arrival blood pressure was 161/62, pulse 70.  Patient was found to have pulmonary edema on chest x-ray.  She was started on IV Lasix for CHF.  She was started on Valcyte for herpes zoster.      Interval Problem Update  9/9: Patient is still on 5L of oxygen.  She states that she had mild improvement in her symptoms.  She found to be iron deficient and started IV Venofer.  Waiting on cardiac echo and cardiology recommendations.  She work with physical therapy today determined she would likely need SNF placement.  9/10: No acute overnight events, feels improved. Eager to get home. Still on more than usual O2 requirement. Family at the bedside. Edema is improving.   9/11: Oxygen requirement reduced to baseline 3L. Feels improved, just with some weakness. No overnight events.   9/12: Feels improved. Sitting up in bed, denies any pain. Has been having intermittent confusion but is oriented x2-3 this AM. Wants to ambulate in the hallway today.     Consultants/Specialty  Cardiology     Code Status  DNR    Disposition  SNF when accepted, anticipate tomorrow    Review of Systems  Review of Systems   Constitutional: Positive for malaise/fatigue (improving). Negative for chills and fever.   HENT: Negative for congestion and sore throat.    Eyes: Negative for blurred vision, pain and discharge.   Respiratory: Positive for cough and shortness of breath (with exertion). Negative for sputum production and wheezing.    Cardiovascular: Positive for leg swelling (improving). Negative for chest pain and palpitations.   Gastrointestinal: Negative for abdominal  pain, blood in stool, nausea and vomiting.   Genitourinary: Negative for dysuria, flank pain and hematuria.   Musculoskeletal: Positive for falls.   Skin: Positive for rash.   Neurological: Positive for weakness. Negative for dizziness, seizures and headaches.   Psychiatric/Behavioral: Negative for depression and hallucinations.   All other systems reviewed and are negative.       Physical Exam  Temp:  [36.4 °C (97.6 °F)-37.5 °C (99.5 °F)] 37.1 °C (98.8 °F)  Pulse:  [54-75] 75  Resp:  [16-18] 17  BP: (125-174)/(50-79) 168/65  SpO2:  [91 %-94 %] 93 %    Physical Exam   Constitutional: She appears well-developed. No distress.   HENT:   Head: Normocephalic and atraumatic.   Mouth/Throat: No oropharyngeal exudate.   Eyes: EOM are normal. No scleral icterus.   Neck: Normal range of motion. Neck supple. JVD present. No tracheal deviation present.   Cardiovascular: Normal rate, regular rhythm and intact distal pulses.   Murmur heard.  Pulmonary/Chest: Effort normal. No stridor. No respiratory distress. She has rales.   Abdominal: Soft. She exhibits no distension. There is no tenderness. There is no guarding.   Musculoskeletal: She exhibits edema (improving). She exhibits no tenderness.   Neurological: She is alert. She has normal reflexes. No cranial nerve deficit.   Generalized weakness   Skin: Rash: lesions on right knee/LE. She is not diaphoretic. No pallor.   Psychiatric: She has a normal mood and affect. Her speech is delayed.   Vitals reviewed.      Fluids    Intake/Output Summary (Last 24 hours) at 9/12/2019 0742  Last data filed at 9/12/2019 0030  Gross per 24 hour   Intake 220 ml   Output 1200 ml   Net -980 ml       Laboratory  Recent Labs     09/10/19  0311 09/11/19  0306 09/12/19  0340   WBC 5.3 6.4 5.9   RBC 3.76* 4.28 4.11*   HEMOGLOBIN 8.4* 9.6* 9.5*   HEMATOCRIT 30.9* 36.5* 32.9*   MCV 82.2 85.3 80.0*   MCH 22.3* 22.4* 23.1*   MCHC 27.2* 26.3* 28.9*   RDW 56.0* 57.4* 52.9*   PLATELETCT 176 200 171   MPV 9.7  10.4 10.5     Recent Labs     09/10/19  0311 09/11/19  0306 09/12/19  0340   SODIUM 142 135 138   POTASSIUM 4.0 4.3 4.2   CHLORIDE 107 102 105   CO2 30 26 23   GLUCOSE 117* 125* 118*   BUN 27* 22 17   CREATININE 1.00 1.11 0.90   CALCIUM 8.3* 8.6 8.4*                   Imaging  EC-ECHOCARDIOGRAM COMPLETE W/O CONT   Final Result           Assessment/Plan  * Acute on chronic diastolic congestive heart failure (HCC)- (present on admission)  Assessment & Plan  Normal EF of 60% with grade 3 diastolic dysfunction.  - s/p IV lasix  - resumed home regimen of PO lasix today  - cardiology evaluated, appreciate recs  - correct electrolytes  - medical management   - outpatient follow up with Cardiology at [facility]  - I/Os, daily weights    Elevated troponin- (present on admission)  Assessment & Plan  Resolved. Likely related to her CHF. Type II demand.   - medical management  - evaluated by cardiology today, appreciate recs    Paroxysmal atrial fibrillation (HCC)- (present on admission)  Assessment & Plan  - Continue amiodarone and metoprolol, eliquis  - continue outpatient follow up    TONY (acute kidney injury) (HCC)- (present on admission)  Assessment & Plan  Resolved. Consistent with cardiorenal, improved with diuresis  - Avoid IV contrast/nephrotoxins/NSAIDs    Microcytic anemia  Assessment & Plan  Stool for Hemoccult at the outlying facility was negative. Severe iron deficiency   - IV Venofer  - Monitor hemoglobin and transfuse if less than 7    Herpes zoster without complication- (present on admission)  Assessment & Plan  - Valtrex 1 g 3 times daily x7 days  - s/p wound care    Acute on chronic respiratory failure with hypoxia (HCC)- (present on admission)  Assessment & Plan  Patient requiring 4-5L, now at 3L NC  - Monitor with diuresis  - wean O2 as able, RT protocol       VTE prophylaxis: eliquis

## 2019-09-12 NOTE — DISCHARGE PLANNING
Agency/Facility Name: [facility]  Spoke To: Clarissa  Outcome: Transport set up for 9/13 @ 1100.

## 2019-09-12 NOTE — THERAPY
"Pt seen for PT tx session. Demonstrated improved activity tolerance with longer distance ambulation. pt noted to desat to mid 70s while ambulating on 3L O2 (which is pt's baseline). Pt required 5 min standing rest break to improve SpO2 to mid 80s and O2 increased to 4L to complete ambulation back to room, RN updated. Provided pt with UE/LE exercise handout to work on between therapy sessions. Pt will continue to benefit from acute PT interventions. Recommend post acute placement to optimize functional independence and safety prior to DC home to [facility].     Physical Therapy Treatment completed.   Bed Mobility:  Supine to Sit: (in bathroom upon PT arrvial)  Transfers: Sit to Stand: Minimal Assist  Gait: Level Of Assist: Minimal Assist with Front-Wheel Walker       Plan of Care: Will benefit from Physical Therapy 3 times per week  Discharge Recommendations: Equipment: Will Continue to Assess for Equipment Needs.   Post-acute therapy: Recommend post-acute placement for continued physical therapy services prior to discharge home. Patient can tolerate post-acute therapies at a 5x/week frequency.          See \"Rehab Therapy-Acute\" Patient Summary Report for complete documentation.       "

## 2019-09-12 NOTE — PROGRESS NOTES
Report received at bedside from NOC RN, pt care assumed, tele box on. Pt aaox2, disoriented to time and place. Patient resting comfortably in bed. POC discussed with pt and verbalizes no questions. Pt c/o of no pain. Pt denies any additional needs at this time. Bed in lowest position, pt educated on fall risk and verbalized understanding, call light within reach, bed alarm plugged in and on.     Patient requiring increase oxygen overnight, pt on 5L of O2 via nasal cannula currently.

## 2019-09-12 NOTE — CARE PLAN
Problem: Safety  Goal: Will remain free from injury  Outcome: PROGRESSING AS EXPECTED  Pt educated on safety precautions and precautions in place. Treaded socks on, bed locked and in lowest position, belongings and call light within reach. Bed alarm in place and on.        Problem: Respiratory:  Goal: Respiratory status will improve  Outcome: PROGRESSING AS EXPECTED   Patient currently on baseline amount of O2 at 3L via NC while awake. Pt demonstrated increase need for oxygen overnight. Respiratory therapist consulted.

## 2019-09-12 NOTE — PROGRESS NOTES
Wound dressing saturated and not staying in place with frequent bathroom trips. Pts daughter requested to remove dressings for now.   Pts RLE weeping. Pad in place for absorption.

## 2019-09-12 NOTE — DISCHARGE PLANNING
Anticipated Discharge Disposition: SNF    Action: LSW requested [facility] to set up transport with [facility] to [place].     Barriers to Discharge: Transport acceptance    Plan: LSW will continue to follow and assist as needed

## 2019-09-12 NOTE — DISCHARGE INSTRUCTIONS
Discharge Instructions    Discharged to other by medical transportation with escort. Discharged via wheelchair, hospital escort: Yes.  Special equipment needed: Oxygen    Be sure to schedule a follow-up appointment with your primary care doctor or any specialists as instructed.     Discharge Plan:   Influenza Vaccine Indication: Indicated: 65 years and older  Influenza Vaccine Given - only chart on this line when given: Influenza Vaccine Given (See MAR)    I understand that a diet low in cholesterol, fat, and sodium is recommended for good health. Unless I have been given specific instructions below for another diet, I accept this instruction as my diet prescription.   Other diet: heart healthy    Special Instructions:   HF Patient Discharge Instructions  · Monitor your weight daily, and maintain a weight chart, to track your weight changes.   · Activity as tolerated, unless your Doctor has ordered otherwise. Other activity order: as tolerated.  · Follow a low fat, low cholesterol, low salt diet unless instructed otherwise by your Doctor. Read the labels on the back of food products and track your intake of fat, cholesterol and salt.   · Fluid Restriction No. If a Fluid Restriction has been ordered by your Doctor, measure fluids with a measuring cup to ensure that you are not exceeding the restriction.   · No smoking.  · Oxygen Yes. If your Doctor has ordered that you wear Oxygen at home, it is important to wear it as ordered.  · Did you receive an explanation from staff on the importance of taking each of your medications and why it is necessary to keep taking them unless your doctor says to stop? Yes  · Were all of your questions answered about how to manage your heart failure and what to do if you have increased signs and symptoms after you go home? Yes  · Do you feel like your heart failure care team involved you in the care treatment plan and allowed you to make decisions regarding your care while in the  hospital and addressed any discharge needs you might have? Yes    See the educational handout provided at discharge for more information on monitoring your daily weight, activity and diet. This also explains more about Heart Failure, symptoms of a flare-up and some of the tests that you have undergone.     Warning Signs of a Flare-Up include:  · Swelling in the ankles or lower legs.  · Shortness of breath, while at rest, or while doing normal activities.   · Shortness of breath at night when in bed, or coughing in bed.   · Requiring more pillows to sleep at night, or needing to sit up at night to sleep.  · Feeling weak, dizzy or fatigued.     When to call your Doctor:  · Call [facility] seven days a week from 8:00 a.m. to 8:00 p.m. for medical questions [phone].  · Call your Primary Care Physician or Cardiologist if:   1. You experience any pain radiating to your jaw or neck.  2. You have any difficulty breathing.  3. You experience weight gain of 3 lbs in a day or 5 lbs in a week.   4. You feel any palpitations or irregular heartbeats.  5. You become dizzy or lose consciousness.   If you have had an angiogram or had a pacemaker or AICD placed, and experience:  1. Bleeding, drainage or swelling at the surgical / puncture site.  2. Fever greater than 100.0 F  3. Shock from internal defibrillator.  4. Cool and / or numb extremities.      · Is patient discharged on Warfarin / Coumadin?   No     Depression / Suicide Risk    As you are discharged from this [facility], it is important to learn how to keep safe from harming yourself.    Recognize the warning signs:  · Abrupt changes in personality, positive or negative- including increase in energy   · Giving away possessions  · Change in eating patterns- significant weight changes-  positive or negative  · Change in sleeping patterns- unable to sleep or sleeping all the time   · Unwillingness or inability to  communicate  · Depression  · Unusual sadness, discouragement and loneliness  · Talk of wanting to die  · Neglect of personal appearance   · Rebelliousness- reckless behavior  · Withdrawal from people/activities they love  · Confusion- inability to concentrate     If you or a loved one observes any of these behaviors or has concerns about self-harm, here's what you can do:  · Talk about it- your feelings and reasons for harming yourself  · Remove any means that you might use to hurt yourself (examples: pills, rope, extension cords, firearm)  · Get professional help from the community (Mental Health, Substance Abuse, psychological counseling)  · Do not be alone:Call your Safe Contact- someone whom you trust who will be there for you.  · Call your local CRISIS HOTLINE 658-5085 or 830-746-3452  · Call your local Children's Mobile Crisis Response Team [place] (325) 966-7265 or www.[employer]  · Call the toll free National Suicide Prevention Hotlines   · National Suicide Prevention Lifeline 763-480-RJHM (1957)  · National Hope Line Network 800-SUICIDE (331-0725)

## 2019-09-13 VITALS
OXYGEN SATURATION: 97 % | HEIGHT: 58 IN | RESPIRATION RATE: 18 BRPM | BODY MASS INDEX: 34.57 KG/M2 | DIASTOLIC BLOOD PRESSURE: 72 MMHG | SYSTOLIC BLOOD PRESSURE: 153 MMHG | WEIGHT: 164.68 LBS | TEMPERATURE: 97.7 F | HEART RATE: 76 BPM

## 2019-09-13 PROBLEM — R79.89 ELEVATED TROPONIN: Status: RESOLVED | Noted: 2019-09-08 | Resolved: 2019-09-13

## 2019-09-13 PROBLEM — N17.9 AKI (ACUTE KIDNEY INJURY) (HCC): Status: RESOLVED | Noted: 2019-09-09 | Resolved: 2019-09-13

## 2019-09-13 PROBLEM — J96.21 ACUTE ON CHRONIC RESPIRATORY FAILURE WITH HYPOXIA (HCC): Status: RESOLVED | Noted: 2019-09-08 | Resolved: 2019-09-13

## 2019-09-13 LAB
ALBUMIN SERPL BCP-MCNC: 3.4 G/DL (ref 3.2–4.9)
ALBUMIN/GLOB SERPL: 1.3 G/DL
ALP SERPL-CCNC: 66 U/L (ref 30–99)
ALT SERPL-CCNC: 20 U/L (ref 2–50)
ANION GAP SERPL CALC-SCNC: 6 MMOL/L (ref 0–11.9)
APPEARANCE UR: CLEAR
AST SERPL-CCNC: 21 U/L (ref 12–45)
BACTERIA #/AREA URNS HPF: NEGATIVE /HPF
BASOPHILS # BLD AUTO: 0.7 % (ref 0–1.8)
BASOPHILS # BLD: 0.04 K/UL (ref 0–0.12)
BILIRUB SERPL-MCNC: 0.7 MG/DL (ref 0.1–1.5)
BILIRUB UR QL STRIP.AUTO: NEGATIVE
BUN SERPL-MCNC: 19 MG/DL (ref 8–22)
CALCIUM SERPL-MCNC: 8.3 MG/DL (ref 8.5–10.5)
CHLORIDE SERPL-SCNC: 104 MMOL/L (ref 96–112)
CO2 SERPL-SCNC: 29 MMOL/L (ref 20–33)
COLOR UR: YELLOW
CREAT SERPL-MCNC: 1.04 MG/DL (ref 0.5–1.4)
EOSINOPHIL # BLD AUTO: 0.12 K/UL (ref 0–0.51)
EOSINOPHIL NFR BLD: 2.1 % (ref 0–6.9)
EPI CELLS #/AREA URNS HPF: NEGATIVE /HPF
ERYTHROCYTE [DISTWIDTH] IN BLOOD BY AUTOMATED COUNT: 54.6 FL (ref 35.9–50)
GLOBULIN SER CALC-MCNC: 2.6 G/DL (ref 1.9–3.5)
GLUCOSE SERPL-MCNC: 117 MG/DL (ref 65–99)
GLUCOSE UR STRIP.AUTO-MCNC: NEGATIVE MG/DL
HCT VFR BLD AUTO: 30.8 % (ref 37–47)
HGB BLD-MCNC: 8.4 G/DL (ref 12–16)
HYALINE CASTS #/AREA URNS LPF: NORMAL /LPF
IMM GRANULOCYTES # BLD AUTO: 0.09 K/UL (ref 0–0.11)
IMM GRANULOCYTES NFR BLD AUTO: 1.6 % (ref 0–0.9)
KETONES UR STRIP.AUTO-MCNC: NEGATIVE MG/DL
LEUKOCYTE ESTERASE UR QL STRIP.AUTO: ABNORMAL
LYMPHOCYTES # BLD AUTO: 0.64 K/UL (ref 1–4.8)
LYMPHOCYTES NFR BLD: 11.2 % (ref 22–41)
MCH RBC QN AUTO: 22.6 PG (ref 27–33)
MCHC RBC AUTO-ENTMCNC: 27.3 G/DL (ref 33.6–35)
MCV RBC AUTO: 82.8 FL (ref 81.4–97.8)
MICRO URNS: ABNORMAL
MONOCYTES # BLD AUTO: 0.51 K/UL (ref 0–0.85)
MONOCYTES NFR BLD AUTO: 8.9 % (ref 0–13.4)
NEUTROPHILS # BLD AUTO: 4.3 K/UL (ref 2–7.15)
NEUTROPHILS NFR BLD: 75.5 % (ref 44–72)
NITRITE UR QL STRIP.AUTO: NEGATIVE
NRBC # BLD AUTO: 0 K/UL
NRBC BLD-RTO: 0 /100 WBC
PH UR STRIP.AUTO: 5.5 [PH] (ref 5–8)
PLATELET # BLD AUTO: 197 K/UL (ref 164–446)
PMV BLD AUTO: 10.4 FL (ref 9–12.9)
POTASSIUM SERPL-SCNC: 4 MMOL/L (ref 3.6–5.5)
PROT SERPL-MCNC: 6 G/DL (ref 6–8.2)
PROT UR QL STRIP: NEGATIVE MG/DL
RBC # BLD AUTO: 3.72 M/UL (ref 4.2–5.4)
RBC # URNS HPF: NORMAL /HPF
RBC UR QL AUTO: NEGATIVE
SODIUM SERPL-SCNC: 139 MMOL/L (ref 135–145)
SP GR UR STRIP.AUTO: 1.01
UROBILINOGEN UR STRIP.AUTO-MCNC: 0.2 MG/DL
WBC # BLD AUTO: 5.7 K/UL (ref 4.8–10.8)
WBC #/AREA URNS HPF: NORMAL /HPF

## 2019-09-13 PROCEDURE — 81001 URINALYSIS AUTO W/SCOPE: CPT

## 2019-09-13 PROCEDURE — 36415 COLL VENOUS BLD VENIPUNCTURE: CPT

## 2019-09-13 PROCEDURE — A9270 NON-COVERED ITEM OR SERVICE: HCPCS | Performed by: HOSPITALIST

## 2019-09-13 PROCEDURE — 99239 HOSP IP/OBS DSCHRG MGMT >30: CPT | Performed by: HOSPITALIST

## 2019-09-13 PROCEDURE — 700102 HCHG RX REV CODE 250 W/ 637 OVERRIDE(OP): Performed by: HOSPITALIST

## 2019-09-13 PROCEDURE — 700111 HCHG RX REV CODE 636 W/ 250 OVERRIDE (IP): Performed by: HOSPITALIST

## 2019-09-13 PROCEDURE — 80053 COMPREHEN METABOLIC PANEL: CPT

## 2019-09-13 PROCEDURE — 85025 COMPLETE CBC W/AUTO DIFF WBC: CPT

## 2019-09-13 PROCEDURE — 51798 US URINE CAPACITY MEASURE: CPT

## 2019-09-13 RX ORDER — VALACYCLOVIR HYDROCHLORIDE 1 G/1
1000 TABLET, FILM COATED ORAL 3 TIMES DAILY
Qty: 40 TAB | Refills: 0
Start: 2019-09-13 | End: 2019-09-15

## 2019-09-13 RX ADMIN — LOSARTAN POTASSIUM 50 MG: 50 TABLET ORAL at 06:12

## 2019-09-13 RX ADMIN — POTASSIUM CHLORIDE 10 MEQ: 20 TABLET, EXTENDED RELEASE ORAL at 06:10

## 2019-09-13 RX ADMIN — AMIODARONE HYDROCHLORIDE 200 MG: 200 TABLET ORAL at 06:12

## 2019-09-13 RX ADMIN — METOPROLOL SUCCINATE 100 MG: 50 TABLET, FILM COATED, EXTENDED RELEASE ORAL at 06:11

## 2019-09-13 RX ADMIN — VALACYCLOVIR HYDROCHLORIDE 1000 MG: 500 TABLET, FILM COATED ORAL at 07:01

## 2019-09-13 RX ADMIN — IRON SUCROSE 200 MG: 20 INJECTION, SOLUTION INTRAVENOUS at 05:50

## 2019-09-13 RX ADMIN — FUROSEMIDE 40 MG: 40 TABLET ORAL at 06:10

## 2019-09-13 RX ADMIN — SENNOSIDES, DOCUSATE SODIUM 2 TABLET: 50; 8.6 TABLET, FILM COATED ORAL at 06:12

## 2019-09-13 RX ADMIN — APIXABAN 5 MG: 5 TABLET, FILM COATED ORAL at 06:13

## 2019-09-13 NOTE — DISCHARGE PLANNING
Anticipated Discharge Disposition: SNF    Action: Pt has been accepted to [facility] and Dr. Dye states that pt is ready for discharge today. Transport has been arranged via [facility] for 1100 today.   CM completed chart copy and COBRA.    Barriers to Discharge:     Plan: Pt to [facility] at 1100 today.

## 2019-09-13 NOTE — PROGRESS NOTES
Pt in bed sleeping, wakes easily to voice. Pt continues to be impulsive and tries to get up and out of bed without using call light. Strip alarm is in place and bed alarm is also being used. Both alarms are functioning properly. No complaints of pain at this time. No signs or symptoms of resp distress noted or reported on 3 L/min via NC. Bed in low and locked position, call button within reach and fall precautions are in place

## 2019-09-13 NOTE — DISCHARGE SUMMARY
Discharge Summary    CHIEF COMPLAINT ON ADMISSION  No chief complaint on file.      Reason for Admission  CHF Exacerbation     CODE STATUS  DNAR/DNI    HPI & HOSPITAL COURSE  This is a 86 y.o. female with diastolic heart failure, atrial fibrillation on Eliquis, mild cognitive impairment and chronic respiratory failure on home oxygen here with weakness and recent falls.  She developed progressively worsening shortness of breath and generalized malaise, then had a vesicular rash that developed on her right lower extremity with bilateral lower extremity edema.  She had recently seen her PCP and her dose of Lasix was increased but she continued to have edema so she came in for further evaluation as a direct admission.  She was found to be hypoxemic on her baseline O2 requirement.  She was admitted to telemetry, diuresed with IV Lasix, and electrolytes repleted.  Elevated troponin but this is likely to type II demand ischemia from her acute on chronic diastolic heart failure.  Cardiology was consulted, echocardiogram showed grade 3 diastolic dysfunction.  She had good urine output and was transitioned back to her new home regimen of Lasix 40 mg p.o. twice daily.  Lower extremity swelling continue to improve and her oxygen requirement returned to baseline.  She was started on valacyclovir for shingles of her right lower extremity.  She felt improved and lower extremity vesicular lesions started to improve and were evaluated by wound care team.    Therefore, she is discharged in good and stable condition to skilled nursing facility.    The patient met 2-midnight criteria for an inpatient stay at the time of discharge.      FOLLOW UP ITEMS POST DISCHARGE  Please follow-up with your PCP and cardiologist.    DISCHARGE DIAGNOSES  Principal Problem (Resolved):    Acute on chronic diastolic congestive heart failure (HCC) POA: Yes  Active Problems:    Paroxysmal atrial fibrillation (HCC) POA: Yes    Herpes zoster without  complication POA: Yes    Microcytic anemia POA: Clinically Undetermined  Resolved Problems:    Elevated troponin POA: Yes    Acute on chronic respiratory failure with hypoxia (HCC) POA: Yes    Falls POA: Yes    TONY (acute kidney injury) (HCC) POA: Yes      FOLLOW UP  Future Appointments   Date Time Provider Department Center   10/14/2019 10:45 AM Amado Avalos M.D. [facility] None     [address] [place] [address]  [phone]          MEDICATIONS ON DISCHARGE     Medication List      START taking these medications      Instructions   valacyclovir 1 GM Tabs  Commonly known as:  VALTREX   Take 1 Tab by mouth 3 times a day for 2 days.  Dose:  1,000 mg        CONTINUE taking these medications      Instructions   amiodarone 200 MG Tabs  Commonly known as:  CORDARONE   Take 200 mg by mouth every morning.  Dose:  200 mg     ELIQUIS 5mg Tabs  Generic drug:  apixaban   Take 5 mg by mouth 2 Times a Day.  Dose:  5 mg     furosemide 40 MG Tabs  Commonly known as:  LASIX   Take 40 mg by mouth 2 Times a Day.  Dose:  40 mg     ICAPS AREDS 2 PO   Take 1 Cap by mouth 2 Times a Day.  Dose:  1 Cap     losartan 50 MG Tabs  Commonly known as:  COZAAR   Take 50 mg by mouth 2 Times a Day.  Dose:  50 mg     metoprolol  MG Tb24  Commonly known as:  TOPROL XL   Take 100 mg by mouth every morning.  Dose:  100 mg     potassium chloride SA 10 MEQ Tbcr  Commonly known as:  K-DUR   Take 10 mEq by mouth 2 times a day.  Dose:  10 mEq     rosuvastatin 10 MG Tabs  Commonly known as:  CRESTOR   Take 10 mg by mouth every bedtime.  Dose:  10 mg            Allergies  Allergies   Allergen Reactions   • Penicillins Hives     Rxn in the 60's per daughter       DIET  Orders Placed This Encounter   Procedures   • Diet Order Cardiac     Standing Status:   Standing     Number of Occurrences:   1     Order Specific Question:   Diet:     Answer:   Cardiac [6]       ACTIVITY  As tolerated and directed by skilled  nursing.  Weight bearing as tolerated    LINES, DRAINS, AND WOUNDS  This is an automated list. Peripheral IVs will be removed prior to discharge.  Peripheral IV 09/13/19 20 G Anterior;Left Forearm (Active)   Site Assessment Clean;Dry;Intact 9/13/2019  6:08 AM   Dressing Type Transparent 9/13/2019  6:08 AM   Line Status Blood return noted;Flushed;Scrubbed the hub prior to access;Saline locked 9/13/2019  6:08 AM   Dressing Status Dry;Clean;Intact 9/13/2019  6:08 AM   Dressing Intervention Initial dressing 9/13/2019  6:08 AM   Infiltration Grading ([facility], CV) 0 9/13/2019  6:08 AM   Phlebitis Scale ([facility] Only) 0 9/13/2019  6:08 AM       Wound 09/08/19 Leg (Active)   Site Assessment Red;Swelling;Other (Comment) 9/12/2019  8:00 PM   [facility]-wound Assessment Red 9/12/2019  8:00 PM   Dressing Options Mepilex 9/12/2019  8:00 AM       Peripheral IV 09/13/19 20 G Anterior;Left Forearm (Active)   Site Assessment Clean;Dry;Intact 9/13/2019  6:08 AM   Dressing Type Transparent 9/13/2019  6:08 AM   Line Status Blood return noted;Flushed;Scrubbed the hub prior to access;Saline locked 9/13/2019  6:08 AM   Dressing Status Dry;Clean;Intact 9/13/2019  6:08 AM   Dressing Intervention Initial dressing 9/13/2019  6:08 AM   Infiltration Grading ([facility], CV) 0 9/13/2019  6:08 AM   Phlebitis Scale ([facility] Only) 0 9/13/2019  6:08 AM               MENTAL STATUS ON TRANSFER  Level of Consciousness: Alert  Orientation : Oriented x 4(intermittent confusion)  Speech: Speech Clear    CONSULTATIONS  Cardiology    PROCEDURES  EC-ECHOCARDIOGRAM COMPLETE W/O CONT   Final Result        No prior study is available for comparison.   Normal left ventricular systolic function.  Left ventricular ejection fraction is visually estimated to be 60%.  Grade III diastolic dysfunction.  Mild mitral regurgitation.  Mild to moderate tricuspid regurgitation.  Estimated right ventricular systolic pressure is 55 mmHg.    LABORATORY  Lab Results   Component Value  Date    SODIUM 139 09/13/2019    POTASSIUM 4.0 09/13/2019    CHLORIDE 104 09/13/2019    CO2 29 09/13/2019    GLUCOSE 117 (H) 09/13/2019    BUN 19 09/13/2019    CREATININE 1.04 09/13/2019        Lab Results   Component Value Date    WBC 5.7 09/13/2019    HEMOGLOBIN 8.4 (L) 09/13/2019    HEMATOCRIT 30.8 (L) 09/13/2019    PLATELETCT 197 09/13/2019        Total time of the discharge process exceeds 38 minutes.

## 2019-09-13 NOTE — PROGRESS NOTES
Notified by PT that pt was desating during ambulated and requiring increase O2 as her note describes. Pt tolerating 3L of O2 at rest, with O2 sat 90-91%

## 2019-09-13 NOTE — CARE PLAN
Problem: Safety  Goal: Will remain free from injury  Outcome: PROGRESSING AS EXPECTED  Note:   Verified that safety precautions are in place and education provided to pt on fall safety and utilization of call button       Problem: Discharge Barriers/Planning  Goal: Patient's continuum of care needs will be met  Outcome: PROGRESSING AS EXPECTED  Intervention: Assess potential discharge barriers on admission and throughout hospital stay  Flowsheets (Taken 9/12/2019 2018)  Does Admitting Nurse Feel This Could be a Complex Discharge?: No  Note:   RN will assess possible discharge barriers on admission and throughout hospital stay

## 2019-09-13 NOTE — PROGRESS NOTES
Patient ordered discharge. IV and cardiac monitor removed, discharge instructions discussed with patient and daughter. Attempt made to call report to [facility]. Placed on hold for 10 minutes, extension given to [facility] to give report for patient. Patient scheduled for 1100 . Awaiting call back.    Call returned by GERONIMO Crawford at [facility]. Report given on patient, transport arrived at 11:15.

## 2019-09-13 NOTE — PROGRESS NOTES
Bedside report received from day shift RN. Pt alert and confused sitting up in chair with chair alarm in place and functioning properly. No complaints of pain and no signs or symptoms of resp distress noted or reported on 3 L/min via NC. Call button within reach and fall precautions are in place